# Patient Record
Sex: MALE | URBAN - METROPOLITAN AREA
[De-identification: names, ages, dates, MRNs, and addresses within clinical notes are randomized per-mention and may not be internally consistent; named-entity substitution may affect disease eponyms.]

---

## 2018-07-24 ENCOUNTER — APPOINTMENT (RX ONLY)
Dept: URBAN - METROPOLITAN AREA CLINIC 4 | Facility: CLINIC | Age: 78
Setting detail: DERMATOLOGY
End: 2018-07-24

## 2018-07-24 DIAGNOSIS — M72.0 PALMAR FASCIAL FIBROMATOSIS [DUPUYTREN]: ICD-10-CM

## 2018-07-24 DIAGNOSIS — L40.3 PUSTULOSIS PALMARIS ET PLANTARIS: ICD-10-CM

## 2018-07-24 PROBLEM — I10 ESSENTIAL (PRIMARY) HYPERTENSION: Status: ACTIVE | Noted: 2018-07-24

## 2018-07-24 PROCEDURE — ? COUNSELING: TOPICAL STEROIDS

## 2018-07-24 PROCEDURE — ? PRESCRIPTION

## 2018-07-24 PROCEDURE — ? COUNSELING

## 2018-07-24 PROCEDURE — ? REFERRAL CORRESPONDENCE

## 2018-07-24 PROCEDURE — 99202 OFFICE O/P NEW SF 15 MIN: CPT

## 2018-07-24 RX ORDER — FLUOCINONIDE 0.5 MG/G
1 OINTMENT TOPICAL QD
Qty: 1 | Refills: 2 | Status: ERX | COMMUNITY
Start: 2018-07-24

## 2018-07-24 RX ORDER — UREA 40 G/100G
CREAM TOPICAL
Qty: 1 | Refills: 11 | Status: ERX | COMMUNITY
Start: 2018-07-24

## 2018-07-24 RX ADMIN — FLUOCINONIDE 1: 0.5 OINTMENT TOPICAL at 00:00

## 2018-07-24 RX ADMIN — UREA: 40 CREAM TOPICAL at 00:00

## 2018-07-24 ASSESSMENT — LOCATION SIMPLE DESCRIPTION DERM
LOCATION SIMPLE: RIGHT PLANTAR SURFACE
LOCATION SIMPLE: LEFT HAND
LOCATION SIMPLE: LEFT PLANTAR SURFACE
LOCATION SIMPLE: RIGHT HAND

## 2018-07-24 ASSESSMENT — LOCATION DETAILED DESCRIPTION DERM
LOCATION DETAILED: LEFT MEDIAL PLANTAR MIDFOOT
LOCATION DETAILED: LEFT ULNAR PALM
LOCATION DETAILED: RIGHT THENAR EMINENCE
LOCATION DETAILED: RIGHT PLANTAR FOREFOOT OVERLYING 3RD METATARSAL
LOCATION DETAILED: RIGHT RADIAL PALM

## 2018-07-24 ASSESSMENT — LOCATION ZONE DERM
LOCATION ZONE: HAND
LOCATION ZONE: FEET

## 2018-07-24 NOTE — HPI: RASH (PSORIASIS)
Do You Have A Family History Of Psoriasis?: no
Is This A New Presentation, Or A Follow-Up?: Psoriasis
Additional History: Has used the ointment but his insurance wouldn’t cover it anymore.

## 2021-01-15 DIAGNOSIS — Z23 NEED FOR VACCINATION: ICD-10-CM

## 2021-09-20 ENCOUNTER — HOSPITAL ENCOUNTER (OUTPATIENT)
Dept: RADIATION ONCOLOGY | Facility: MEDICAL CENTER | Age: 81
End: 2021-09-30
Attending: RADIOLOGY
Payer: COMMERCIAL

## 2021-09-20 VITALS
HEIGHT: 67 IN | BODY MASS INDEX: 38.61 KG/M2 | SYSTOLIC BLOOD PRESSURE: 156 MMHG | HEART RATE: 62 BPM | OXYGEN SATURATION: 95 % | DIASTOLIC BLOOD PRESSURE: 92 MMHG | WEIGHT: 246 LBS | TEMPERATURE: 97.1 F

## 2021-09-20 DIAGNOSIS — C61 PROSTATE CANCER (HCC): ICD-10-CM

## 2021-09-20 PROCEDURE — 99214 OFFICE O/P EST MOD 30 MIN: CPT | Performed by: RADIOLOGY

## 2021-09-20 PROCEDURE — 99205 OFFICE O/P NEW HI 60 MIN: CPT | Performed by: RADIOLOGY

## 2021-09-20 RX ORDER — FOLIC ACID 1 MG/1
1 TABLET ORAL DAILY
COMMUNITY

## 2021-09-20 RX ORDER — M-VIT,TX,IRON,MINS/CALC/FOLIC 27MG-0.4MG
1 TABLET ORAL DAILY
COMMUNITY

## 2021-09-20 RX ORDER — ROSUVASTATIN CALCIUM 5 MG/1
5 TABLET, COATED ORAL EVERY EVENING
COMMUNITY

## 2021-09-20 RX ORDER — LEVOTHYROXINE SODIUM 0.2 MG/1
200 TABLET ORAL
COMMUNITY

## 2021-09-20 RX ORDER — CHLORAL HYDRATE 500 MG
1000 CAPSULE ORAL
COMMUNITY

## 2021-09-20 RX ORDER — METHOTREXATE 2.5 MG/1
25 TABLET ORAL
COMMUNITY

## 2021-09-20 RX ORDER — CHOLECALCIFEROL (VITAMIN D3) 125 MCG
500 CAPSULE ORAL DAILY
COMMUNITY

## 2021-09-20 RX ORDER — TAMSULOSIN HYDROCHLORIDE 0.4 MG/1
0.4 CAPSULE ORAL
COMMUNITY

## 2021-09-20 RX ORDER — TRIAMCINOLONE ACETONIDE 1 MG/G
CREAM TOPICAL 2 TIMES DAILY
COMMUNITY

## 2021-09-20 RX ORDER — GALANTAMINE HYDROBROMIDE 4 MG/1
16 TABLET, FILM COATED ORAL DAILY
COMMUNITY

## 2021-09-20 ASSESSMENT — PAIN SCALES - GENERAL: PAINLEVEL: NO PAIN

## 2021-09-20 NOTE — CONSULTS
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 Cody today in consultation at the request of Dr. Rothman for his prostate cancer.  Patient is an 81-year-old gentleman who has had a mildly elevated PSA for a number of years.  His PSA in 2020 was 6.96.  6-month repeat showed an elevation to 12.68 repeated 6 weeks later at 12.10.  Given this precipitous rise transrectal ultrasound with biopsy was recommended.  This confirmed adenocarcinoma the prostate.  He had 4 out of 12 core biopsies positive.  One core biopsy was Richey 4+4 = 8, 2 core biopsies were Richey 4+3 equal 7, and 1 core biopsy with Rima 3+4 equal 7.  His prostate volume was relatively low at 17.5 cc.  Given his high risk component he was given an injection of Lupron.  I been asked to see him for consideration of external beam radiotherapy.    PAST MEDICAL HISTORY:   Past Medical History:   Diagnosis Date   • BPH (benign prostatic hyperplasia)    • Cancer (HCC)     Malignant neoplasm of prostate   • Dementia (HCC)    • Hyperlipidemia    • Obesity    • Psoriasis    • Thyroid disease     Hypothyroid       PAST SURGICAL HISTORY:  Past Surgical History:   Procedure Laterality Date   • ABDOMINAL EXPLORATION      Hernia repair   • CHOLECYSTECTOMY     • HERNIA REPAIR         CURRENT MEDICATIONS:  Current Outpatient Medications   Medication Sig Dispense Refill   • tamsulosin (FLOMAX) 0.4 MG capsule Take 0.4 mg by mouth 1/2 hour after breakfast.     • methotrexate 2.5 MG tablet Take 25 mg by mouth.     • folic acid (FOLVITE) 1 MG Tab Take 1 mg by mouth every day.     • rosuvastatin (CRESTOR) 5 MG Tab Take 5 mg by mouth every evening.     • levothyroxine (SYNTHROID) 200 MCG Tab Take 200 mcg by mouth every morning on an empty stomach.     • CLOBETASOL  "PROP OINT-COAL TAR EX Apply  topically.     • triamcinolone acetonide (KENALOG) 0.1 % Cream Apply  topically 2 times a day.     • aspirin EC (ECOTRIN) 81 MG Tablet Delayed Response Take 81 mg by mouth every day.     • therapeutic multivitamin-minerals (THERAGRAN-M) Tab Take 1 Tablet by mouth every day.     • cyanocobalamin (VITAMIN B-12) 500 MCG Tab Take 500 mcg by mouth every day.     • Omega-3 Fatty Acids (FISH OIL) 1000 MG Cap capsule Take 1,000 mg by mouth 3 times a day with meals.     • Calcium-Vitamins C & D 500- MG-MG-UNIT Chew Tab Chew.     • galantamine (REMINYL) 4 MG Tab Take 16 mg by mouth every day.       No current facility-administered medications for this encounter.       ALLERGIES:    Patient has no known allergies.    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  Had [employer] service time  Lives with: spouse in [place].    REVIEW OF SYSTEMS:  A complete review of systems was completed in patient's chart on 2021.  All are negative with relationship to this diagnosis with the exception of:  Patient does not have any symptoms he would attribute to his prostate cancer.  He denies any musculoskeletal deformities or new pains.  He does have underlying dementia but is well managed.    PHYSICAL EXAM:    Vitals:    21 0853   BP: 156/92   BP Location: Right arm   Patient Position: Sitting   BP Cuff Size: Adult   Pulse: 62   Temp: 36.2 °C (97.1 °F)   TempSrc: Temporal   SpO2: 95%   Weight: 112 kg (246 lb)   Height: 1.702 m (5' 7\")   Pain Score: " No pain      1= Restricted in physically strenuous activity, but ambulatory and able to carry out work of a light sedentary nature, e.g., light housework, office work.    PAIN:  0  Pt reports no acute/ chronic pain at today's office visit.    GENERAL: No apparent distress.  HEENT:  Pupils are equal, round, and reactive to light.  Extraocular muscles   are intact. Sclerae nonicteric.  Conjunctivae pink.  Oral cavity, tongue   protrudes midline.   NECK:  Supple without evidence of thyromegaly.  NODES:  No peripheral adenopathy of the neck, supraclavicular fossa or axillae   bilaterally.  LUNGS:  Clear to ascultation bilaterally   HEART:  Regular rate and rhythm.  No murmur appreciated  ABDOMEN:  Soft. No evidence of hepatosplenomegaly.  Positive bowel sounds.  EXTREMITIES:  Without Edema.  NEUROLOGIC:  Cranial nerves II through XII were intact. Normal stance and gait motor and sensory grossly within normal limits        IPSS:  IN THE PAST MONTH:     1.  Incomplete Emptying: How often have you had the sensation of not emptying your bladder?  3 = About half the time    2.  Frequency: How often have you had to urinate less than every two hours? 4 = More than half the time    3.  Intermittency: How often have you found you stopped and started again several times when you urinated? 5 = Almost always    4.  Urgency: How often have you found it difficult to postpone urination? 4 = More than half the time    5.  Weak Stream: How often have you had a weak urinary stream? 5 = Almost always    6.  Straining: How often have you had to strain to start urination? 5 = Almost always    7.  Nocturia: How many times did you typically get up at night to urinate? 2 = 2 times    TOTAL: 28 20 - 35 = Severe    QUALITY OF LIFE DUE TO URINARY SYMPTOMS:     If you were to spend the rest of your life with your urinary condition just the way it is now, how would you feel about that? 5 = Unhappy      KIRSTEN:  SEXUAL HEALTH INVENTORY FOR MEN (KIRSTEN):    Over the past 6 months:   1.  How do you rate your confidence that you could get and keep an erection?  1 = Very low    2.  When you had erections with sexual stimulation, how often were your erections hard enough for penetration (entering your partner)? 1 = Almost never or never     3.  During sexual intercourse, how often were you able to maintain your erection after you had penetrated (entered) your partner? 0 = Did not attempt    4.  During sexual intercourse, how difficult was it to maintain your erection to completion of intercourse? 0= Did not attempt intercourse    5.  When you attempted sexual intercourse, how often was it satisfactory for you? 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  I discussed with the patient and his wife the features that place him at high risk stratification.  However we highlighted that only one core biopsy had Rima 8 disease.  Therefore the majority of his features are more akin to unfavorable intermediate risk.  I had a discussion with Dr. Rothman regarding his management.  At minimum he will receive short course androgen deprivation therapy and does wish to discuss definitive external beam radiation.  Thus far he is tolerating the Lupron quite well.  I discussed either standard fractionation radiation to the 40 fractions compared with stereotactic radiosurgery of 5 fractions.  We went over the likely side effect profile for each regimen.  Ultimately patient felt the 40 fraction regimen would be his choice.  This is largely to try to minimize some  of the acute side effects seen with stereotactic radiosurgery.  He was given a simulation for next Monday at 11 AM.      We discussed the risks, benefits and side effects of treatment and the patient is amenable to treatment.  If patient has any questions or concerns, he should feel free to contact me.    Thank you for the opportunity to participate in his care.  If any questions or comments, please do not hesitate in calling.

## 2021-09-20 NOTE — CT SIMULATION
PATIENT NAME Vik Cody   PRIMARY PHYSICIAN No primary care provider on file. [id number]   REFERRING PHYSICIAN Bryan Rothman M.D. 1940     Prostate cancer (HCC)  Staging form: Prostate, AJCC 8th Edition  - Clinical: Stage IIC (cT1c, cN0, cM0, PSA: 12.1, Grade Group: 4) - Signed by Jarvis Xiong M.D. on 9/20/2021  Prostate specific antigen (PSA) range: 10 to 19  [place] score: 8  Histologic grading system: 5 grade system         Treatment Planning CT Simulation      Order Questions     Question Answer Comment    Is this for a new course of treatment? Yes     Is this an Addendum? No     Implanted Device/Pacemaker No     Simulation Status Initial     Treatment Site Prostate     Laterality None     Treatment Technique IMRT     Other Technique(s) IMRT     Treatment Pattern/Frequency Daily     Concurrent Chemotherapy No     CT Technique 3D     Slice Thickness 3mm     Scan Extent Pelvis     Bowel Preparation Yes     Treatment Device(s) Vac Elizabeth     Patient Attire Gown     Patient Position Supine     Patient Orientation Head First     Arm Position On Chest     Bladder Full     Treatment Machine No preference     Treatment Image Guidance CBCT     Frequency (CBCT) Daily     Image Guidance Match PTV - Soft Tissue Prostate    Other Orders Weekly Physics Check

## 2021-09-20 NOTE — NON-PROVIDER
"Patient was seen today in clinic with Dr. Xiong for consultation.  Vitals signs and weight were obtained and pain assessment was completed.  Allergies and medications were reviewed with the patient.    Vitals/Pain:  Vitals:    09/20/21 0853   BP: 156/92   BP Location: Right arm   Patient Position: Sitting   BP Cuff Size: Adult   Pulse: 62   Temp: 36.2 °C (97.1 °F)   TempSrc: Temporal   SpO2: 95%   Weight: 112 kg (246 lb)   Height: 1.702 m (5' 7\")   Pain Score: No pain    Allergies:   Patient has no known allergies.    Current Medications:  Current Outpatient Medications   Medication Sig Dispense Refill   • tamsulosin (FLOMAX) 0.4 MG capsule Take 0.4 mg by mouth 1/2 hour after breakfast.     • methotrexate 2.5 MG tablet Take 25 mg by mouth.     • folic acid (FOLVITE) 1 MG Tab Take 1 mg by mouth every day.     • rosuvastatin (CRESTOR) 5 MG Tab Take 5 mg by mouth every evening.     • levothyroxine (SYNTHROID) 200 MCG Tab Take 200 mcg by mouth every morning on an empty stomach.     • CLOBETASOL PROP OINT-COAL TAR EX Apply  topically.     • triamcinolone acetonide (KENALOG) 0.1 % Cream Apply  topically 2 times a day.     • aspirin EC (ECOTRIN) 81 MG Tablet Delayed Response Take 81 mg by mouth every day.     • therapeutic multivitamin-minerals (THERAGRAN-M) Tab Take 1 Tablet by mouth every day.     • cyanocobalamin (VITAMIN B-12) 500 MCG Tab Take 500 mcg by mouth every day.     • Omega-3 Fatty Acids (FISH OIL) 1000 MG Cap capsule Take 1,000 mg by mouth 3 times a day with meals.     • Calcium-Vitamins C & D 500- MG-MG-UNIT Chew Tab Chew.     • galantamine (REMINYL) 4 MG Tab Take 16 mg by mouth every day.       No current facility-administered medications for this encounter.         PCP:  No primary care provider on file.        Nia Vazquez R.N.  "

## 2021-09-21 ENCOUNTER — HOSPITAL ENCOUNTER (OUTPATIENT)
Dept: RADIOLOGY | Facility: MEDICAL CENTER | Age: 81
End: 2021-09-21
Payer: COMMERCIAL

## 2021-09-23 ENCOUNTER — PATIENT OUTREACH (OUTPATIENT)
Dept: OTHER | Facility: MEDICAL CENTER | Age: 81
End: 2021-09-23

## 2021-09-23 NOTE — PROGRESS NOTES
Call placed to patient for nurse navigation, mailbox is full and unable to leave message.  No other number on file.  Will try again at later date.

## 2021-09-27 ENCOUNTER — HOSPITAL ENCOUNTER (OUTPATIENT)
Dept: RADIATION ONCOLOGY | Facility: MEDICAL CENTER | Age: 81
End: 2021-09-27
Payer: COMMERCIAL

## 2021-09-27 PROCEDURE — 77263 THER RADIOLOGY TX PLNG CPLX: CPT | Performed by: RADIOLOGY

## 2021-09-27 PROCEDURE — 77334 RADIATION TREATMENT AID(S): CPT | Mod: 26 | Performed by: RADIOLOGY

## 2021-09-27 PROCEDURE — 77334 RADIATION TREATMENT AID(S): CPT | Performed by: RADIOLOGY

## 2021-09-27 PROCEDURE — 77290 THER RAD SIMULAJ FIELD CPLX: CPT | Performed by: RADIOLOGY

## 2021-09-30 PROCEDURE — 77338 DESIGN MLC DEVICE FOR IMRT: CPT | Performed by: RADIOLOGY

## 2021-09-30 PROCEDURE — 77301 RADIOTHERAPY DOSE PLAN IMRT: CPT | Performed by: RADIOLOGY

## 2021-09-30 PROCEDURE — 77300 RADIATION THERAPY DOSE PLAN: CPT | Mod: 26 | Performed by: RADIOLOGY

## 2021-09-30 PROCEDURE — 77338 DESIGN MLC DEVICE FOR IMRT: CPT | Mod: 26 | Performed by: RADIOLOGY

## 2021-09-30 PROCEDURE — 77301 RADIOTHERAPY DOSE PLAN IMRT: CPT | Mod: 26 | Performed by: RADIOLOGY

## 2021-09-30 PROCEDURE — 77300 RADIATION THERAPY DOSE PLAN: CPT | Performed by: RADIOLOGY

## 2021-10-01 ENCOUNTER — HOSPITAL ENCOUNTER (OUTPATIENT)
Dept: RADIATION ONCOLOGY | Facility: MEDICAL CENTER | Age: 81
End: 2021-10-31
Attending: RADIOLOGY
Payer: COMMERCIAL

## 2021-10-04 ENCOUNTER — HOSPITAL ENCOUNTER (OUTPATIENT)
Dept: RADIATION ONCOLOGY | Facility: MEDICAL CENTER | Age: 81
End: 2021-10-04
Payer: COMMERCIAL

## 2021-10-04 LAB
CHEMOTHERAPY INFUSION START DATE: NORMAL
CHEMOTHERAPY RECORDS: 2
CHEMOTHERAPY RECORDS: 5000
CHEMOTHERAPY RECORDS: NORMAL
CHEMOTHERAPY RX CANCER: NORMAL
DATE 1ST CHEMO CANCER: NORMAL
RAD ONC ARIA COURSE LAST TREATMENT DATE: NORMAL
RAD ONC ARIA COURSE TREATMENT ELAPSED DAYS: NORMAL
RAD ONC ARIA REFERENCE POINT DOSAGE GIVEN TO DATE: 2
RAD ONC ARIA REFERENCE POINT DOSAGE GIVEN TO DATE: 2.06
RAD ONC ARIA REFERENCE POINT ID: NORMAL
RAD ONC ARIA REFERENCE POINT ID: NORMAL
RAD ONC ARIA REFERENCE POINT SESSION DOSAGE GIVEN: 2
RAD ONC ARIA REFERENCE POINT SESSION DOSAGE GIVEN: 2.06

## 2021-10-04 PROCEDURE — 77280 THER RAD SIMULAJ FIELD SMPL: CPT | Performed by: RADIOLOGY

## 2021-10-04 PROCEDURE — 77385 HCHG IMRT DELIVERY SIMPLE: CPT | Performed by: RADIOLOGY

## 2021-10-05 ENCOUNTER — HOSPITAL ENCOUNTER (OUTPATIENT)
Dept: RADIATION ONCOLOGY | Facility: MEDICAL CENTER | Age: 81
End: 2021-10-05

## 2021-10-05 LAB
CHEMOTHERAPY INFUSION START DATE: NORMAL
CHEMOTHERAPY RECORDS: 2
CHEMOTHERAPY RECORDS: 5000
CHEMOTHERAPY RECORDS: NORMAL
CHEMOTHERAPY RX CANCER: NORMAL
DATE 1ST CHEMO CANCER: NORMAL
RAD ONC ARIA COURSE LAST TREATMENT DATE: NORMAL
RAD ONC ARIA COURSE TREATMENT ELAPSED DAYS: NORMAL
RAD ONC ARIA REFERENCE POINT DOSAGE GIVEN TO DATE: 4
RAD ONC ARIA REFERENCE POINT DOSAGE GIVEN TO DATE: 4.13
RAD ONC ARIA REFERENCE POINT ID: NORMAL
RAD ONC ARIA REFERENCE POINT ID: NORMAL
RAD ONC ARIA REFERENCE POINT SESSION DOSAGE GIVEN: 2
RAD ONC ARIA REFERENCE POINT SESSION DOSAGE GIVEN: 2.06

## 2021-10-05 PROCEDURE — 77014 PR CT GUIDANCE PLACEMENT RAD THERAPY FIELDS: CPT | Mod: 26 | Performed by: RADIOLOGY

## 2021-10-05 PROCEDURE — 77385 HCHG IMRT DELIVERY SIMPLE: CPT | Performed by: RADIOLOGY

## 2021-10-06 ENCOUNTER — HOSPITAL ENCOUNTER (OUTPATIENT)
Dept: RADIATION ONCOLOGY | Facility: MEDICAL CENTER | Age: 81
End: 2021-10-06
Payer: COMMERCIAL

## 2021-10-06 VITALS
HEART RATE: 73 BPM | SYSTOLIC BLOOD PRESSURE: 155 MMHG | TEMPERATURE: 97.3 F | OXYGEN SATURATION: 98 % | DIASTOLIC BLOOD PRESSURE: 85 MMHG

## 2021-10-06 LAB
CHEMOTHERAPY INFUSION START DATE: NORMAL
CHEMOTHERAPY RECORDS: 2
CHEMOTHERAPY RECORDS: 5000
CHEMOTHERAPY RECORDS: NORMAL
CHEMOTHERAPY RX CANCER: NORMAL
DATE 1ST CHEMO CANCER: NORMAL
RAD ONC ARIA COURSE LAST TREATMENT DATE: NORMAL
RAD ONC ARIA COURSE TREATMENT ELAPSED DAYS: NORMAL
RAD ONC ARIA REFERENCE POINT DOSAGE GIVEN TO DATE: 6
RAD ONC ARIA REFERENCE POINT DOSAGE GIVEN TO DATE: 6.19
RAD ONC ARIA REFERENCE POINT ID: NORMAL
RAD ONC ARIA REFERENCE POINT ID: NORMAL
RAD ONC ARIA REFERENCE POINT SESSION DOSAGE GIVEN: 2
RAD ONC ARIA REFERENCE POINT SESSION DOSAGE GIVEN: 2.06

## 2021-10-06 PROCEDURE — 77014 PR CT GUIDANCE PLACEMENT RAD THERAPY FIELDS: CPT | Mod: 26 | Performed by: RADIOLOGY

## 2021-10-06 PROCEDURE — 77336 RADIATION PHYSICS CONSULT: CPT | Performed by: RADIOLOGY

## 2021-10-06 PROCEDURE — 77385 HCHG IMRT DELIVERY SIMPLE: CPT | Performed by: RADIOLOGY

## 2021-10-06 ASSESSMENT — PAIN SCALES - GENERAL: PAINLEVEL: NO PAIN

## 2021-10-06 NOTE — ON TREATMENT VISIT
ON TREATMENT  NOTE  RADIATION ONCOLOGY DEPARTMENT    Patient name:  Vik Cody    Primary Physician:  No primary care provider on file. MRN: [id number]  CSN: [id number]   Referring physician:  Bryan Rothman M.D. : 1940, 81 y.o.     ENCOUNTER DATE:  10/06/21    DIAGNOSIS:    Prostate cancer (HCC)  Staging form: Prostate, AJCC 8th Edition  - Clinical: Stage IIC (cT1c, cN0, cM0, PSA: 12.1, Grade Group: 4) - Signed by Jarvis Xiong M.D. on 2021  Prostate specific antigen (PSA) range: 10 to 19  [place] score: 8  Histologic grading system: 5 grade system      TREATMENT SUMMARY:  [facility] Treatment Information        Some values may be hidden. Unless noted otherwise, only the newest values recorded on each date are displayed.         [facility] Treatment Summary 10/6/21   Course First Treatment Date 10/04/2021   Course Last Treatment Date 10/06/2021   Prostate/SV Plan from Course C1_Prostate   Fraction 3 of 25   Elapsed Course Days 2 @ [id number]   Prescribed Fraction Dose 200 cGy   Prescribed Total Dose 5,000 cGy   Prostate/SV Reference Point from Course C1_Prostate   Elapsed Course Days 2 @ [id number]   Session Dose 200 cGy   Total Dose 600 cGy   Prostate/SV CP Reference Point from Course C1_Prostate   Elapsed Course Days 2 @ [id number]   Session Dose 206 cGy   Total Dose 619 cGy             SUBJECTIVE:  Tolerating therapy without event.  Having a little bit of trouble holding his full bladder for treatments but otherwise is fine        VITAL SIGNS:  KPS: 100, Fully active, able to carry on all pre-disease performed without restriction (ECOG equivalent 0)  Encounter Vitals  Temperature: 36.3 °C (97.3 °F)  Temp src: Temporal  Blood Pressure : 155/85  Pulse: 73  Pulse Oximetry: 98 %  Pain Score: No pain  Pain Assessment 10/6/2021 2021   Pain Score 0 0   Some recent data might be hidden          PHYSICAL EXAM:  No change      Toxicity Assessment 10/6/2021   Toxicity Assessment Male Pelvis    Fatigue (lethargy, malaise, asthenia) None   Radiation Dermatitis None   Anorexia None   Colitis None   Constipation None   Dehydration None   Diarrhea w/o Colostomy None   Flatulence None   Nausea None   Proctitis None   Vomiting None   RT - Pain due to RT None   Tumor Pain (onset or exacerbation of tumor pain due to treatment) None   Dysuria (painful urination) None   Urinary Frequency Normal   Urinary Urgency None   Bladder Spasms Absent   Incontinence None   Urinary Retention Normal           IMPRESSION:  Cancer Staging  Prostate cancer (HCC)  Staging form: Prostate, AJCC 8th Edition  - Clinical: Stage IIC (cT1c, cN0, cM0, PSA: 12.1, Grade Group: 4) - Signed by Jarvis Xiong M.D. on 9/20/2021      PLAN:  No change in treatment plan    Disposition:  Treatment plan reviewed. Questions answered. Continue therapy outlined.     Stephani Mason M.D.    No orders of the defined types were placed in this encounter.

## 2021-10-07 ENCOUNTER — HOSPITAL ENCOUNTER (OUTPATIENT)
Dept: RADIATION ONCOLOGY | Facility: MEDICAL CENTER | Age: 81
End: 2021-10-07
Payer: COMMERCIAL

## 2021-10-07 LAB
CHEMOTHERAPY INFUSION START DATE: NORMAL
CHEMOTHERAPY RECORDS: 2
CHEMOTHERAPY RECORDS: 5000
CHEMOTHERAPY RECORDS: NORMAL
CHEMOTHERAPY RX CANCER: NORMAL
DATE 1ST CHEMO CANCER: NORMAL
RAD ONC ARIA COURSE LAST TREATMENT DATE: NORMAL
RAD ONC ARIA COURSE TREATMENT ELAPSED DAYS: NORMAL
RAD ONC ARIA REFERENCE POINT DOSAGE GIVEN TO DATE: 8
RAD ONC ARIA REFERENCE POINT DOSAGE GIVEN TO DATE: 8.25
RAD ONC ARIA REFERENCE POINT ID: NORMAL
RAD ONC ARIA REFERENCE POINT ID: NORMAL
RAD ONC ARIA REFERENCE POINT SESSION DOSAGE GIVEN: 2
RAD ONC ARIA REFERENCE POINT SESSION DOSAGE GIVEN: 2.06

## 2021-10-07 PROCEDURE — 77385 HCHG IMRT DELIVERY SIMPLE: CPT | Performed by: RADIOLOGY

## 2021-10-07 PROCEDURE — 77014 PR CT GUIDANCE PLACEMENT RAD THERAPY FIELDS: CPT | Mod: 26 | Performed by: RADIOLOGY

## 2021-10-08 ENCOUNTER — HOSPITAL ENCOUNTER (OUTPATIENT)
Dept: RADIATION ONCOLOGY | Facility: MEDICAL CENTER | Age: 81
End: 2021-10-08
Payer: COMMERCIAL

## 2021-10-08 LAB
CHEMOTHERAPY INFUSION START DATE: NORMAL
CHEMOTHERAPY RECORDS: 2
CHEMOTHERAPY RECORDS: 5000
CHEMOTHERAPY RECORDS: NORMAL
CHEMOTHERAPY RX CANCER: NORMAL
DATE 1ST CHEMO CANCER: NORMAL
RAD ONC ARIA COURSE LAST TREATMENT DATE: NORMAL
RAD ONC ARIA COURSE TREATMENT ELAPSED DAYS: NORMAL
RAD ONC ARIA REFERENCE POINT DOSAGE GIVEN TO DATE: 10
RAD ONC ARIA REFERENCE POINT DOSAGE GIVEN TO DATE: 10.32
RAD ONC ARIA REFERENCE POINT ID: NORMAL
RAD ONC ARIA REFERENCE POINT ID: NORMAL
RAD ONC ARIA REFERENCE POINT SESSION DOSAGE GIVEN: 2
RAD ONC ARIA REFERENCE POINT SESSION DOSAGE GIVEN: 2.06

## 2021-10-08 PROCEDURE — 77014 PR CT GUIDANCE PLACEMENT RAD THERAPY FIELDS: CPT | Mod: 26 | Performed by: RADIOLOGY

## 2021-10-08 PROCEDURE — 77385 HCHG IMRT DELIVERY SIMPLE: CPT | Performed by: RADIOLOGY

## 2021-10-08 PROCEDURE — 77427 RADIATION TX MANAGEMENT X5: CPT | Performed by: RADIOLOGY

## 2021-10-11 ENCOUNTER — HOSPITAL ENCOUNTER (OUTPATIENT)
Dept: RADIATION ONCOLOGY | Facility: MEDICAL CENTER | Age: 81
End: 2021-10-11
Payer: COMMERCIAL

## 2021-10-11 LAB
CHEMOTHERAPY INFUSION START DATE: NORMAL
CHEMOTHERAPY RECORDS: 2
CHEMOTHERAPY RECORDS: 5000
CHEMOTHERAPY RECORDS: NORMAL
CHEMOTHERAPY RX CANCER: NORMAL
DATE 1ST CHEMO CANCER: NORMAL
RAD ONC ARIA COURSE LAST TREATMENT DATE: NORMAL
RAD ONC ARIA COURSE TREATMENT ELAPSED DAYS: NORMAL
RAD ONC ARIA REFERENCE POINT DOSAGE GIVEN TO DATE: 12
RAD ONC ARIA REFERENCE POINT DOSAGE GIVEN TO DATE: 12.38
RAD ONC ARIA REFERENCE POINT ID: NORMAL
RAD ONC ARIA REFERENCE POINT ID: NORMAL
RAD ONC ARIA REFERENCE POINT SESSION DOSAGE GIVEN: 2
RAD ONC ARIA REFERENCE POINT SESSION DOSAGE GIVEN: 2.06

## 2021-10-11 PROCEDURE — 77014 PR CT GUIDANCE PLACEMENT RAD THERAPY FIELDS: CPT | Mod: 26 | Performed by: RADIOLOGY

## 2021-10-11 PROCEDURE — 77385 HCHG IMRT DELIVERY SIMPLE: CPT | Performed by: RADIOLOGY

## 2021-10-12 ENCOUNTER — HOSPITAL ENCOUNTER (OUTPATIENT)
Dept: RADIATION ONCOLOGY | Facility: MEDICAL CENTER | Age: 81
End: 2021-10-12
Payer: COMMERCIAL

## 2021-10-12 LAB
CHEMOTHERAPY INFUSION START DATE: NORMAL
CHEMOTHERAPY RECORDS: 2
CHEMOTHERAPY RECORDS: 5000
CHEMOTHERAPY RECORDS: NORMAL
CHEMOTHERAPY RX CANCER: NORMAL
DATE 1ST CHEMO CANCER: NORMAL
RAD ONC ARIA COURSE LAST TREATMENT DATE: NORMAL
RAD ONC ARIA COURSE TREATMENT ELAPSED DAYS: NORMAL
RAD ONC ARIA REFERENCE POINT DOSAGE GIVEN TO DATE: 14
RAD ONC ARIA REFERENCE POINT DOSAGE GIVEN TO DATE: 14.44
RAD ONC ARIA REFERENCE POINT ID: NORMAL
RAD ONC ARIA REFERENCE POINT ID: NORMAL
RAD ONC ARIA REFERENCE POINT SESSION DOSAGE GIVEN: 2
RAD ONC ARIA REFERENCE POINT SESSION DOSAGE GIVEN: 2.06

## 2021-10-12 PROCEDURE — 77014 PR CT GUIDANCE PLACEMENT RAD THERAPY FIELDS: CPT | Mod: 26 | Performed by: RADIOLOGY

## 2021-10-12 PROCEDURE — 77385 HCHG IMRT DELIVERY SIMPLE: CPT | Performed by: RADIOLOGY

## 2021-10-13 ENCOUNTER — HOSPITAL ENCOUNTER (OUTPATIENT)
Dept: RADIATION ONCOLOGY | Facility: MEDICAL CENTER | Age: 81
End: 2021-10-13
Payer: COMMERCIAL

## 2021-10-13 VITALS
OXYGEN SATURATION: 95 % | SYSTOLIC BLOOD PRESSURE: 131 MMHG | TEMPERATURE: 96.9 F | DIASTOLIC BLOOD PRESSURE: 61 MMHG | HEART RATE: 74 BPM

## 2021-10-13 LAB
CHEMOTHERAPY INFUSION START DATE: NORMAL
CHEMOTHERAPY RECORDS: 2
CHEMOTHERAPY RECORDS: 5000
CHEMOTHERAPY RECORDS: NORMAL
CHEMOTHERAPY RX CANCER: NORMAL
DATE 1ST CHEMO CANCER: NORMAL
RAD ONC ARIA COURSE LAST TREATMENT DATE: NORMAL
RAD ONC ARIA COURSE TREATMENT ELAPSED DAYS: NORMAL
RAD ONC ARIA REFERENCE POINT DOSAGE GIVEN TO DATE: 16
RAD ONC ARIA REFERENCE POINT DOSAGE GIVEN TO DATE: 16.51
RAD ONC ARIA REFERENCE POINT ID: NORMAL
RAD ONC ARIA REFERENCE POINT ID: NORMAL
RAD ONC ARIA REFERENCE POINT SESSION DOSAGE GIVEN: 2
RAD ONC ARIA REFERENCE POINT SESSION DOSAGE GIVEN: 2.06

## 2021-10-13 PROCEDURE — 77385 HCHG IMRT DELIVERY SIMPLE: CPT | Performed by: RADIOLOGY

## 2021-10-13 PROCEDURE — 77336 RADIATION PHYSICS CONSULT: CPT | Performed by: RADIOLOGY

## 2021-10-13 PROCEDURE — 77014 PR CT GUIDANCE PLACEMENT RAD THERAPY FIELDS: CPT | Mod: 26 | Performed by: RADIOLOGY

## 2021-10-13 ASSESSMENT — PAIN SCALES - GENERAL: PAINLEVEL: NO PAIN

## 2021-10-13 NOTE — ON TREATMENT VISIT
ON TREATMENT NOTE  RADIATION ONCOLOGY DEPARTMENT    Patient name:  Vik Cody    Primary Physician:  Pcp Pt States None MRN: [id number]  CSN: [id number]   Referring physician:  Bryna Rothman M.D. : 1940, 81 y.o.     ENCOUNTER DATE:  10/13/21    DIAGNOSIS:    Prostate cancer (HCC)  Staging form: Prostate, AJCC 8th Edition  - Clinical: Stage IIC (cT1c, cN0, cM0, PSA: 12.1, Grade Group: 4) - Signed by Jarvis Xiong M.D. on 2021  Prostate specific antigen (PSA) range: 10 to 19  [place] score: 8  Histologic grading system: 5 grade system      TREATMENT SUMMARY:  [facility] Treatment Information        Some values may be hidden. Unless noted otherwise, only the newest values recorded on each date are displayed.         [facility] Treatment Summary 10/13/21   Course First Treatment Date 10/04/2021   Course Last Treatment Date 10/13/2021   Prostate/SV Plan from Course C1_Prostate   Fraction 8 of 25   Elapsed Course Days 9 @ [id number]   Prescribed Fraction Dose 200 cGy   Prescribed Total Dose 5,000 cGy   Prostate/SV Reference Point from Course C1_Prostate   Elapsed Course Days 9 @ [id number]   Session Dose 200 cGy   Total Dose 1,600 cGy   Prostate/SV CP Reference Point from Course C1_Prostate   Elapsed Course Days 9 @ [id number]   Session Dose 206 cGy   Total Dose 1,651 cGy              SUBJECTIVE:   Patient is doing well.  He has been experience some hot flashes from his androgen deprivation therapy.  Over the last several days he has also had some increase in loose stools.  I did discuss he could try 1/2 tablet of Imodium if his bowel patterns continue.  Otherwise his urinary patterns are very stable.    VITAL SIGNS:    Encounter Vitals  Temperature: 36.1 °C (96.9 °F)  Blood Pressure : 131/61  Pulse: 74  Pulse Oximetry: 95 %  Pain Score: No pain  Pain Assessment 10/13/2021 10/6/2021 2021   Pain Score 0 0 0   Some recent data might be hidden          PHYSICAL EXAM:  Physical Exam  Genitourinary:      Comments: Normal         TOXICITY  Toxicity Assessment 10/13/2021 10/6/2021   Toxicity Assessment Male Pelvis Male Pelvis   Fatigue (lethargy, malaise, asthenia) Increased fatigue over baseline, but not altering normal activities None   Radiation Dermatitis None None   Anorexia None None   Colitis None None   Constipation None None   Dehydration None None   Diarrhea w/o Colostomy Increase of <4 stools/day over pre-treatment None   Flatulence None None   Nausea None None   Proctitis None None   Vomiting None None   RT - Pain due to RT None None   Tumor Pain (onset or exacerbation of tumor pain due to treatment) None None   Dysuria (painful urination) Mild symptoms requiring no intervention None   Urinary Frequency Increase in frequency up to 2x normal Normal   Urinary Urgency Present None   Bladder Spasms Mild symptoms, not requiring intervention Absent   Incontinence With coughing, sneezing, etc. None   Urinary Retention Hesitency or dribbling, but no significant residual urine and/or retention occuring during the immediate postoperative period Normal         IMPRESSION:  Cancer Staging  Prostate cancer (HCC)  Staging form: Prostate, AJCC 8th Edition  - Clinical: Stage IIC (cT1c, cN0, cM0, PSA: 12.1, Grade Group: 4) - Signed by Jarvis Xiong M.D. on 9/20/2021      PLAN:  No change in treatment plan    Disposition:  Treatment plan reviewed. Questions answered. Continue therapy outlined.     Jarvis Xiong M.D.    No orders of the defined types were placed in this encounter.

## 2021-10-14 ENCOUNTER — HOSPITAL ENCOUNTER (OUTPATIENT)
Dept: RADIATION ONCOLOGY | Facility: MEDICAL CENTER | Age: 81
End: 2021-10-14
Payer: COMMERCIAL

## 2021-10-14 LAB
CHEMOTHERAPY INFUSION START DATE: NORMAL
CHEMOTHERAPY RECORDS: 2
CHEMOTHERAPY RECORDS: 5000
CHEMOTHERAPY RECORDS: NORMAL
CHEMOTHERAPY RX CANCER: NORMAL
DATE 1ST CHEMO CANCER: NORMAL
RAD ONC ARIA COURSE LAST TREATMENT DATE: NORMAL
RAD ONC ARIA COURSE TREATMENT ELAPSED DAYS: NORMAL
RAD ONC ARIA REFERENCE POINT DOSAGE GIVEN TO DATE: 18
RAD ONC ARIA REFERENCE POINT DOSAGE GIVEN TO DATE: 18.57
RAD ONC ARIA REFERENCE POINT ID: NORMAL
RAD ONC ARIA REFERENCE POINT ID: NORMAL
RAD ONC ARIA REFERENCE POINT SESSION DOSAGE GIVEN: 2
RAD ONC ARIA REFERENCE POINT SESSION DOSAGE GIVEN: 2.06

## 2021-10-14 PROCEDURE — 77014 PR CT GUIDANCE PLACEMENT RAD THERAPY FIELDS: CPT | Mod: 26 | Performed by: RADIOLOGY

## 2021-10-14 PROCEDURE — 77385 HCHG IMRT DELIVERY SIMPLE: CPT | Performed by: RADIOLOGY

## 2021-10-15 ENCOUNTER — HOSPITAL ENCOUNTER (OUTPATIENT)
Dept: RADIATION ONCOLOGY | Facility: MEDICAL CENTER | Age: 81
End: 2021-10-15
Payer: COMMERCIAL

## 2021-10-15 LAB
CHEMOTHERAPY INFUSION START DATE: NORMAL
CHEMOTHERAPY RECORDS: 2
CHEMOTHERAPY RECORDS: 5000
CHEMOTHERAPY RECORDS: NORMAL
CHEMOTHERAPY RX CANCER: NORMAL
DATE 1ST CHEMO CANCER: NORMAL
RAD ONC ARIA COURSE LAST TREATMENT DATE: NORMAL
RAD ONC ARIA COURSE TREATMENT ELAPSED DAYS: NORMAL
RAD ONC ARIA REFERENCE POINT DOSAGE GIVEN TO DATE: 20
RAD ONC ARIA REFERENCE POINT DOSAGE GIVEN TO DATE: 20.63
RAD ONC ARIA REFERENCE POINT ID: NORMAL
RAD ONC ARIA REFERENCE POINT ID: NORMAL
RAD ONC ARIA REFERENCE POINT SESSION DOSAGE GIVEN: 2
RAD ONC ARIA REFERENCE POINT SESSION DOSAGE GIVEN: 2.06

## 2021-10-15 PROCEDURE — 77014 PR CT GUIDANCE PLACEMENT RAD THERAPY FIELDS: CPT | Mod: 26 | Performed by: RADIOLOGY

## 2021-10-15 PROCEDURE — 77427 RADIATION TX MANAGEMENT X5: CPT | Performed by: RADIOLOGY

## 2021-10-15 PROCEDURE — 77385 HCHG IMRT DELIVERY SIMPLE: CPT | Performed by: RADIOLOGY

## 2021-10-18 ENCOUNTER — HOSPITAL ENCOUNTER (OUTPATIENT)
Dept: RADIATION ONCOLOGY | Facility: MEDICAL CENTER | Age: 81
End: 2021-10-18
Payer: COMMERCIAL

## 2021-10-18 LAB
CHEMOTHERAPY INFUSION START DATE: NORMAL
CHEMOTHERAPY RECORDS: 2
CHEMOTHERAPY RECORDS: 5000
CHEMOTHERAPY RECORDS: NORMAL
CHEMOTHERAPY RX CANCER: NORMAL
DATE 1ST CHEMO CANCER: NORMAL
RAD ONC ARIA COURSE LAST TREATMENT DATE: NORMAL
RAD ONC ARIA COURSE TREATMENT ELAPSED DAYS: NORMAL
RAD ONC ARIA REFERENCE POINT DOSAGE GIVEN TO DATE: 22
RAD ONC ARIA REFERENCE POINT DOSAGE GIVEN TO DATE: 22.69
RAD ONC ARIA REFERENCE POINT ID: NORMAL
RAD ONC ARIA REFERENCE POINT ID: NORMAL
RAD ONC ARIA REFERENCE POINT SESSION DOSAGE GIVEN: 2
RAD ONC ARIA REFERENCE POINT SESSION DOSAGE GIVEN: 2.06

## 2021-10-18 PROCEDURE — 77385 HCHG IMRT DELIVERY SIMPLE: CPT | Performed by: RADIOLOGY

## 2021-10-18 PROCEDURE — 77014 PR CT GUIDANCE PLACEMENT RAD THERAPY FIELDS: CPT | Mod: 26 | Performed by: RADIOLOGY

## 2021-10-19 ENCOUNTER — HOSPITAL ENCOUNTER (OUTPATIENT)
Dept: RADIATION ONCOLOGY | Facility: MEDICAL CENTER | Age: 81
End: 2021-10-19
Payer: COMMERCIAL

## 2021-10-19 LAB
CHEMOTHERAPY INFUSION START DATE: NORMAL
CHEMOTHERAPY RECORDS: 2
CHEMOTHERAPY RECORDS: 5000
CHEMOTHERAPY RECORDS: NORMAL
CHEMOTHERAPY RX CANCER: NORMAL
DATE 1ST CHEMO CANCER: NORMAL
RAD ONC ARIA COURSE LAST TREATMENT DATE: NORMAL
RAD ONC ARIA COURSE TREATMENT ELAPSED DAYS: NORMAL
RAD ONC ARIA REFERENCE POINT DOSAGE GIVEN TO DATE: 24
RAD ONC ARIA REFERENCE POINT DOSAGE GIVEN TO DATE: 24.76
RAD ONC ARIA REFERENCE POINT ID: NORMAL
RAD ONC ARIA REFERENCE POINT ID: NORMAL
RAD ONC ARIA REFERENCE POINT SESSION DOSAGE GIVEN: 2
RAD ONC ARIA REFERENCE POINT SESSION DOSAGE GIVEN: 2.06

## 2021-10-19 PROCEDURE — 77014 PR CT GUIDANCE PLACEMENT RAD THERAPY FIELDS: CPT | Mod: 26 | Performed by: RADIOLOGY

## 2021-10-19 PROCEDURE — 77385 HCHG IMRT DELIVERY SIMPLE: CPT | Performed by: RADIOLOGY

## 2021-10-20 ENCOUNTER — HOSPITAL ENCOUNTER (OUTPATIENT)
Dept: RADIATION ONCOLOGY | Facility: MEDICAL CENTER | Age: 81
End: 2021-10-20
Payer: COMMERCIAL

## 2021-10-20 VITALS
TEMPERATURE: 97.6 F | HEART RATE: 69 BPM | DIASTOLIC BLOOD PRESSURE: 81 MMHG | OXYGEN SATURATION: 94 % | SYSTOLIC BLOOD PRESSURE: 140 MMHG

## 2021-10-20 LAB
CHEMOTHERAPY INFUSION START DATE: NORMAL
CHEMOTHERAPY RECORDS: 2
CHEMOTHERAPY RECORDS: 5000
CHEMOTHERAPY RECORDS: NORMAL
CHEMOTHERAPY RX CANCER: NORMAL
DATE 1ST CHEMO CANCER: NORMAL
RAD ONC ARIA COURSE LAST TREATMENT DATE: NORMAL
RAD ONC ARIA COURSE TREATMENT ELAPSED DAYS: NORMAL
RAD ONC ARIA REFERENCE POINT DOSAGE GIVEN TO DATE: 26
RAD ONC ARIA REFERENCE POINT DOSAGE GIVEN TO DATE: 26.82
RAD ONC ARIA REFERENCE POINT ID: NORMAL
RAD ONC ARIA REFERENCE POINT ID: NORMAL
RAD ONC ARIA REFERENCE POINT SESSION DOSAGE GIVEN: 2
RAD ONC ARIA REFERENCE POINT SESSION DOSAGE GIVEN: 2.06

## 2021-10-20 PROCEDURE — 77336 RADIATION PHYSICS CONSULT: CPT | Performed by: RADIOLOGY

## 2021-10-20 PROCEDURE — 77014 PR CT GUIDANCE PLACEMENT RAD THERAPY FIELDS: CPT | Mod: 26 | Performed by: RADIOLOGY

## 2021-10-20 PROCEDURE — 77385 HCHG IMRT DELIVERY SIMPLE: CPT | Performed by: RADIOLOGY

## 2021-10-20 ASSESSMENT — PAIN SCALES - GENERAL: PAINLEVEL: NO PAIN

## 2021-10-20 NOTE — ON TREATMENT VISIT
ON TREATMENT NOTE  RADIATION ONCOLOGY DEPARTMENT    Patient name:  Vik Cody    Primary Physician:  Pcp Pt States None MRN: [id number]  CSN: [id number]   Referring physician:  Bryan Rothman M.D. : 1940, 81 y.o.     ENCOUNTER DATE:  10/20/21    DIAGNOSIS:    Prostate cancer (HCC)  Staging form: Prostate, AJCC 8th Edition  - Clinical: Stage IIC (cT1c, cN0, cM0, PSA: 12.1, Grade Group: 4) - Signed by Jarvis Xiong M.D. on 2021  Prostate specific antigen (PSA) range: 10 to 19  [place] score: 8  Histologic grading system: 5 grade system      TREATMENT SUMMARY:  [facility] Treatment Information        Some values may be hidden. Unless noted otherwise, only the newest values recorded on each date are displayed.         [facility] Treatment Summary 10/20/21   Course First Treatment Date 10/04/2021   Course Last Treatment Date 10/20/2021   Prostate/SV Plan from Course C1_Prostate   Fraction    Elapsed Course Days  @    Prescribed Fraction Dose 200 cGy   Prescribed Total Dose 5,000 cGy   Prostate/SV Reference Point from Course C1_Prostate   Elapsed Course Days  @    Session Dose 200 cGy   Total Dose 2,600 cGy   Prostate/SV CP Reference Point from Course C1_Prostate   Elapsed Course Days    Session Dose 206 cGy   Total Dose 2,682 cGy              SUBJECTIVE:   Patient remains at his baseline urinary symptoms.  These include urgency, hesitancy incomplete voiding, and leakage.  He currently takes Flomax 0.4 mg strength.  I have encouraged him to try to increase this to 0.8 mg strength.  He does not yet have any changes he would attribute to his radiation.    VITAL SIGNS:    Encounter Vitals  Temperature: 36.4 °C (97.6 °F)  Blood Pressure : 140/81  Pulse: 69  Pulse Oximetry: 94 %  Pain Score: No pain  Pain Assessment 10/20/2021 10/13/2021 10/6/2021 2021   Pain Score 0 0 0 0   Some recent data might be hidden          PHYSICAL EXAM:  Physical  Exam  Genitourinary:     Comments: Normal         TOXICITY  Toxicity Assessment 10/20/2021 10/13/2021 10/6/2021   Toxicity Assessment Male Pelvis Male Pelvis Male Pelvis   Fatigue (lethargy, malaise, asthenia) Increased fatigue over baseline, but not altering normal activities Increased fatigue over baseline, but not altering normal activities None   Radiation Dermatitis None None None   Anorexia None None None   Colitis None None None   Constipation None None None   Dehydration None None None   Diarrhea w/o Colostomy None Increase of <4 stools/day over pre-treatment None   Flatulence None None None   Nausea None None None   Proctitis None None None   Vomiting None None None   RT - Pain due to RT None None None   Tumor Pain (onset or exacerbation of tumor pain due to treatment) None None None   Dysuria (painful urination) None Mild symptoms requiring no intervention None   Urinary Frequency Increase in frequency up to 2x normal Increase in frequency up to 2x normal Normal   Urinary Urgency None Present None   Bladder Spasms Absent Mild symptoms, not requiring intervention Absent   Incontinence None With coughing, sneezing, etc. None   Urinary Retention Hesitency or dribbling, but no significant residual urine and/or retention occuring during the immediate postoperative period Hesitency or dribbling, but no significant residual urine and/or retention occuring during the immediate postoperative period Normal         IMPRESSION:  Cancer Staging  Prostate cancer (HCC)  Staging form: Prostate, AJCC 8th Edition  - Clinical: Stage IIC (cT1c, cN0, cM0, PSA: 12.1, Grade Group: 4) - Signed by Jarvis Xiong M.D. on 9/20/2021      PLAN:  No change in treatment plan    Disposition:  Treatment plan reviewed. Questions answered. Continue therapy outlined.     Jarvis Xiong M.D.    Orders Placed This Encounter   • Methotrexate (XATMEP PO)

## 2021-10-21 ENCOUNTER — HOSPITAL ENCOUNTER (OUTPATIENT)
Dept: RADIATION ONCOLOGY | Facility: MEDICAL CENTER | Age: 81
End: 2021-10-21
Payer: COMMERCIAL

## 2021-10-21 LAB
CHEMOTHERAPY INFUSION START DATE: NORMAL
CHEMOTHERAPY RECORDS: 2
CHEMOTHERAPY RECORDS: 5000
CHEMOTHERAPY RECORDS: NORMAL
CHEMOTHERAPY RX CANCER: NORMAL
DATE 1ST CHEMO CANCER: NORMAL
RAD ONC ARIA COURSE LAST TREATMENT DATE: NORMAL
RAD ONC ARIA COURSE TREATMENT ELAPSED DAYS: NORMAL
RAD ONC ARIA REFERENCE POINT DOSAGE GIVEN TO DATE: 28
RAD ONC ARIA REFERENCE POINT DOSAGE GIVEN TO DATE: 28.88
RAD ONC ARIA REFERENCE POINT ID: NORMAL
RAD ONC ARIA REFERENCE POINT ID: NORMAL
RAD ONC ARIA REFERENCE POINT SESSION DOSAGE GIVEN: 2
RAD ONC ARIA REFERENCE POINT SESSION DOSAGE GIVEN: 2.06

## 2021-10-21 PROCEDURE — 77014 PR CT GUIDANCE PLACEMENT RAD THERAPY FIELDS: CPT | Mod: 26 | Performed by: RADIOLOGY

## 2021-10-21 PROCEDURE — 77385 HCHG IMRT DELIVERY SIMPLE: CPT | Performed by: RADIOLOGY

## 2021-10-22 ENCOUNTER — HOSPITAL ENCOUNTER (OUTPATIENT)
Dept: RADIATION ONCOLOGY | Facility: MEDICAL CENTER | Age: 81
End: 2021-10-22
Payer: COMMERCIAL

## 2021-10-22 LAB
CHEMOTHERAPY INFUSION START DATE: NORMAL
CHEMOTHERAPY RECORDS: 2
CHEMOTHERAPY RECORDS: 5000
CHEMOTHERAPY RECORDS: NORMAL
CHEMOTHERAPY RX CANCER: NORMAL
DATE 1ST CHEMO CANCER: NORMAL
RAD ONC ARIA COURSE LAST TREATMENT DATE: NORMAL
RAD ONC ARIA COURSE TREATMENT ELAPSED DAYS: NORMAL
RAD ONC ARIA REFERENCE POINT DOSAGE GIVEN TO DATE: 30
RAD ONC ARIA REFERENCE POINT DOSAGE GIVEN TO DATE: 30.95
RAD ONC ARIA REFERENCE POINT ID: NORMAL
RAD ONC ARIA REFERENCE POINT ID: NORMAL
RAD ONC ARIA REFERENCE POINT SESSION DOSAGE GIVEN: 2
RAD ONC ARIA REFERENCE POINT SESSION DOSAGE GIVEN: 2.06

## 2021-10-22 PROCEDURE — 77385 HCHG IMRT DELIVERY SIMPLE: CPT | Performed by: RADIOLOGY

## 2021-10-22 PROCEDURE — 77427 RADIATION TX MANAGEMENT X5: CPT | Performed by: RADIOLOGY

## 2021-10-22 PROCEDURE — 77014 PR CT GUIDANCE PLACEMENT RAD THERAPY FIELDS: CPT | Mod: 26 | Performed by: RADIOLOGY

## 2021-10-25 ENCOUNTER — HOSPITAL ENCOUNTER (OUTPATIENT)
Dept: RADIATION ONCOLOGY | Facility: MEDICAL CENTER | Age: 81
End: 2021-10-25
Payer: COMMERCIAL

## 2021-10-25 LAB
CHEMOTHERAPY INFUSION START DATE: NORMAL
CHEMOTHERAPY RECORDS: 2
CHEMOTHERAPY RECORDS: 5000
CHEMOTHERAPY RECORDS: NORMAL
CHEMOTHERAPY RX CANCER: NORMAL
DATE 1ST CHEMO CANCER: NORMAL
RAD ONC ARIA COURSE LAST TREATMENT DATE: NORMAL
RAD ONC ARIA COURSE TREATMENT ELAPSED DAYS: NORMAL
RAD ONC ARIA REFERENCE POINT DOSAGE GIVEN TO DATE: 32
RAD ONC ARIA REFERENCE POINT DOSAGE GIVEN TO DATE: 33.01
RAD ONC ARIA REFERENCE POINT ID: NORMAL
RAD ONC ARIA REFERENCE POINT ID: NORMAL
RAD ONC ARIA REFERENCE POINT SESSION DOSAGE GIVEN: 2
RAD ONC ARIA REFERENCE POINT SESSION DOSAGE GIVEN: 2.06

## 2021-10-25 PROCEDURE — 77385 HCHG IMRT DELIVERY SIMPLE: CPT | Performed by: RADIOLOGY

## 2021-10-25 PROCEDURE — 77014 PR CT GUIDANCE PLACEMENT RAD THERAPY FIELDS: CPT | Mod: 26 | Performed by: RADIOLOGY

## 2021-10-26 ENCOUNTER — HOSPITAL ENCOUNTER (OUTPATIENT)
Dept: RADIATION ONCOLOGY | Facility: MEDICAL CENTER | Age: 81
End: 2021-10-26
Payer: COMMERCIAL

## 2021-10-26 LAB
CHEMOTHERAPY INFUSION START DATE: NORMAL
CHEMOTHERAPY RECORDS: 2
CHEMOTHERAPY RECORDS: 5000
CHEMOTHERAPY RECORDS: NORMAL
CHEMOTHERAPY RX CANCER: NORMAL
DATE 1ST CHEMO CANCER: NORMAL
RAD ONC ARIA COURSE LAST TREATMENT DATE: NORMAL
RAD ONC ARIA COURSE TREATMENT ELAPSED DAYS: NORMAL
RAD ONC ARIA REFERENCE POINT DOSAGE GIVEN TO DATE: 34
RAD ONC ARIA REFERENCE POINT DOSAGE GIVEN TO DATE: 35.07
RAD ONC ARIA REFERENCE POINT ID: NORMAL
RAD ONC ARIA REFERENCE POINT ID: NORMAL
RAD ONC ARIA REFERENCE POINT SESSION DOSAGE GIVEN: 2
RAD ONC ARIA REFERENCE POINT SESSION DOSAGE GIVEN: 2.06

## 2021-10-26 PROCEDURE — 77014 PR CT GUIDANCE PLACEMENT RAD THERAPY FIELDS: CPT | Mod: 26 | Performed by: RADIOLOGY

## 2021-10-26 PROCEDURE — 77385 HCHG IMRT DELIVERY SIMPLE: CPT | Performed by: RADIOLOGY

## 2021-10-27 ENCOUNTER — HOSPITAL ENCOUNTER (OUTPATIENT)
Dept: RADIATION ONCOLOGY | Facility: MEDICAL CENTER | Age: 81
End: 2021-10-27
Payer: COMMERCIAL

## 2021-10-27 VITALS
OXYGEN SATURATION: 95 % | DIASTOLIC BLOOD PRESSURE: 86 MMHG | TEMPERATURE: 97.6 F | HEART RATE: 105 BPM | SYSTOLIC BLOOD PRESSURE: 133 MMHG

## 2021-10-27 LAB
CHEMOTHERAPY INFUSION START DATE: NORMAL
CHEMOTHERAPY RECORDS: 2
CHEMOTHERAPY RECORDS: 5000
CHEMOTHERAPY RECORDS: NORMAL
CHEMOTHERAPY RX CANCER: NORMAL
DATE 1ST CHEMO CANCER: NORMAL
RAD ONC ARIA COURSE LAST TREATMENT DATE: NORMAL
RAD ONC ARIA COURSE TREATMENT ELAPSED DAYS: NORMAL
RAD ONC ARIA REFERENCE POINT DOSAGE GIVEN TO DATE: 36
RAD ONC ARIA REFERENCE POINT DOSAGE GIVEN TO DATE: 37.14
RAD ONC ARIA REFERENCE POINT ID: NORMAL
RAD ONC ARIA REFERENCE POINT ID: NORMAL
RAD ONC ARIA REFERENCE POINT SESSION DOSAGE GIVEN: 2
RAD ONC ARIA REFERENCE POINT SESSION DOSAGE GIVEN: 2.06

## 2021-10-27 PROCEDURE — 77385 HCHG IMRT DELIVERY SIMPLE: CPT | Performed by: RADIOLOGY

## 2021-10-27 PROCEDURE — 77014 PR CT GUIDANCE PLACEMENT RAD THERAPY FIELDS: CPT | Mod: 26 | Performed by: RADIOLOGY

## 2021-10-27 PROCEDURE — 77336 RADIATION PHYSICS CONSULT: CPT | Performed by: RADIOLOGY

## 2021-10-27 ASSESSMENT — PAIN SCALES - GENERAL: PAINLEVEL: NO PAIN

## 2021-10-27 NOTE — ON TREATMENT VISIT
ON TREATMENT NOTE  RADIATION ONCOLOGY DEPARTMENT    Patient name:  Vik Cody    Primary Physician:  Pcp Pt States None MRN: [id number]  CSN: [id number]   Referring physician:  Bryan Rothman M.D. : 1940, 81 y.o.     ENCOUNTER DATE:  10/27/21    DIAGNOSIS:    Prostate cancer (HCC)  Staging form: Prostate, AJCC 8th Edition  - Clinical: Stage IIC (cT1c, cN0, cM0, PSA: 12.1, Grade Group: 4) - Signed by Jarvis Xiong M.D. on 2021  Prostate specific antigen (PSA) range: 10 to 19  [place] score: 8  Histologic grading system: 5 grade system      TREATMENT SUMMARY:  [facility] Treatment Information        Some values may be hidden. Unless noted otherwise, only the newest values recorded on each date are displayed.         [facility] Treatment Summary 10/27/21   Course First Treatment Date 10/04/2021   Course Last Treatment Date 10/27/2021   Prostate/SV Plan from Course C1_Prostate   Fraction 18 of 25   Elapsed Course Days  @ [id number]   Prescribed Fraction Dose 200 cGy   Prescribed Total Dose 5,000 cGy   Prostate/SV Reference Point from Course C1_Prostate   Elapsed Course Days  @ [id number]   Session Dose 200 cGy   Total Dose 3,600 cGy   Prostate/SV CP Reference Point from Course C1_Prostate   Elapsed Course Days  @ [id number]   Session Dose 206 cGy   Total Dose 3,714 cGy              SUBJECTIVE:   Patient is doing well.  He does not have any changes he would attribute to his radiation.  We did appreciate however a variable heart rate on today's visit.  Both by machine and manual palpation.  He states he does not have a history of atrial fibrillation or flutter.  He is completely asymptomatic.  I have asked him to be aware of any symptoms that could arise but otherwise I would like him to stop after treatment tomorrow and also have us check his pulse.    VITAL SIGNS:    Encounter Vitals  Temperature: 36.4 °C (97.6 °F)  Blood Pressure : 133/86  Pulse: (!) 105  Pulse Oximetry: 95 %  Pain Score: No  pain  Pain Assessment 10/27/2021 10/20/2021 10/13/2021 10/6/2021 9/20/2021   Pain Score 0 0 0 0 0   Some recent data might be hidden          PHYSICAL EXAM:  Physical Exam  Genitourinary:     Comments: Normal         TOXICITY  Toxicity Assessment 10/27/2021 10/20/2021 10/13/2021 10/6/2021   Toxicity Assessment Male Pelvis Male Pelvis Male Pelvis Male Pelvis   Fatigue (lethargy, malaise, asthenia) None Increased fatigue over baseline, but not altering normal activities Increased fatigue over baseline, but not altering normal activities None   Radiation Dermatitis None None None None   Anorexia None None None None   Colitis None None None None   Constipation None None None None   Dehydration None None None None   Diarrhea w/o Colostomy None None Increase of <4 stools/day over pre-treatment None   Flatulence None None None None   Nausea None None None None   Proctitis None None None None   Vomiting None None None None   RT - Pain due to RT None None None None   Tumor Pain (onset or exacerbation of tumor pain due to treatment) None None None None   Dysuria (painful urination) None None Mild symptoms requiring no intervention None   Urinary Frequency Normal Increase in frequency up to 2x normal Increase in frequency up to 2x normal Normal   Urinary Urgency None None Present None   Bladder Spasms Absent Absent Mild symptoms, not requiring intervention Absent   Incontinence None None With coughing, sneezing, etc. None   Urinary Retention Normal Hesitency or dribbling, but no significant residual urine and/or retention occuring during the immediate postoperative period Hesitency or dribbling, but no significant residual urine and/or retention occuring during the immediate postoperative period Normal         IMPRESSION:  Cancer Staging  Prostate cancer (HCC)  Staging form: Prostate, AJCC 8th Edition  - Clinical: Stage IIC (cT1c, cN0, cM0, PSA: 12.1, Grade Group: 4) - Signed by Jarvis Xiong M.D. on  9/20/2021      PLAN:  No change in treatment plan    Disposition:  Treatment plan reviewed. Questions answered. Continue therapy outlined.     Jarvis Xiong M.D.    No orders of the defined types were placed in this encounter.

## 2021-10-28 ENCOUNTER — HOSPITAL ENCOUNTER (OUTPATIENT)
Dept: RADIATION ONCOLOGY | Facility: MEDICAL CENTER | Age: 81
End: 2021-10-28
Payer: COMMERCIAL

## 2021-10-28 LAB
CHEMOTHERAPY INFUSION START DATE: NORMAL
CHEMOTHERAPY RECORDS: 2
CHEMOTHERAPY RECORDS: 5000
CHEMOTHERAPY RECORDS: NORMAL
CHEMOTHERAPY RX CANCER: NORMAL
DATE 1ST CHEMO CANCER: NORMAL
RAD ONC ARIA COURSE LAST TREATMENT DATE: NORMAL
RAD ONC ARIA COURSE TREATMENT ELAPSED DAYS: NORMAL
RAD ONC ARIA REFERENCE POINT DOSAGE GIVEN TO DATE: 38
RAD ONC ARIA REFERENCE POINT DOSAGE GIVEN TO DATE: 39.2
RAD ONC ARIA REFERENCE POINT ID: NORMAL
RAD ONC ARIA REFERENCE POINT ID: NORMAL
RAD ONC ARIA REFERENCE POINT SESSION DOSAGE GIVEN: 2
RAD ONC ARIA REFERENCE POINT SESSION DOSAGE GIVEN: 2.06

## 2021-10-28 PROCEDURE — 77385 HCHG IMRT DELIVERY SIMPLE: CPT | Performed by: RADIOLOGY

## 2021-10-28 PROCEDURE — 77014 PR CT GUIDANCE PLACEMENT RAD THERAPY FIELDS: CPT | Mod: 26 | Performed by: RADIOLOGY

## 2021-11-01 ENCOUNTER — HOSPITAL ENCOUNTER (OUTPATIENT)
Dept: RADIATION ONCOLOGY | Facility: MEDICAL CENTER | Age: 81
End: 2021-11-01

## 2021-11-01 ENCOUNTER — HOSPITAL ENCOUNTER (OUTPATIENT)
Dept: RADIATION ONCOLOGY | Facility: MEDICAL CENTER | Age: 81
End: 2021-11-30
Attending: RADIOLOGY
Payer: COMMERCIAL

## 2021-11-01 LAB
CHEMOTHERAPY INFUSION START DATE: NORMAL
CHEMOTHERAPY RECORDS: 2
CHEMOTHERAPY RECORDS: 5000
CHEMOTHERAPY RECORDS: NORMAL
CHEMOTHERAPY RX CANCER: NORMAL
DATE 1ST CHEMO CANCER: NORMAL
RAD ONC ARIA COURSE LAST TREATMENT DATE: NORMAL
RAD ONC ARIA COURSE TREATMENT ELAPSED DAYS: NORMAL
RAD ONC ARIA REFERENCE POINT DOSAGE GIVEN TO DATE: 40
RAD ONC ARIA REFERENCE POINT DOSAGE GIVEN TO DATE: 41.26
RAD ONC ARIA REFERENCE POINT ID: NORMAL
RAD ONC ARIA REFERENCE POINT ID: NORMAL
RAD ONC ARIA REFERENCE POINT SESSION DOSAGE GIVEN: 2
RAD ONC ARIA REFERENCE POINT SESSION DOSAGE GIVEN: 2.06

## 2021-11-01 PROCEDURE — 77427 RADIATION TX MANAGEMENT X5: CPT | Performed by: RADIOLOGY

## 2021-11-01 PROCEDURE — 77014 PR CT GUIDANCE PLACEMENT RAD THERAPY FIELDS: CPT | Mod: 26 | Performed by: RADIOLOGY

## 2021-11-01 PROCEDURE — 77385 HCHG IMRT DELIVERY SIMPLE: CPT | Performed by: RADIOLOGY

## 2021-11-02 ENCOUNTER — HOSPITAL ENCOUNTER (OUTPATIENT)
Dept: RADIATION ONCOLOGY | Facility: MEDICAL CENTER | Age: 81
End: 2021-11-02
Payer: COMMERCIAL

## 2021-11-02 LAB
CHEMOTHERAPY INFUSION START DATE: NORMAL
CHEMOTHERAPY RECORDS: 2
CHEMOTHERAPY RECORDS: 5000
CHEMOTHERAPY RECORDS: NORMAL
CHEMOTHERAPY RX CANCER: NORMAL
DATE 1ST CHEMO CANCER: NORMAL
RAD ONC ARIA COURSE LAST TREATMENT DATE: NORMAL
RAD ONC ARIA COURSE TREATMENT ELAPSED DAYS: NORMAL
RAD ONC ARIA REFERENCE POINT DOSAGE GIVEN TO DATE: 42
RAD ONC ARIA REFERENCE POINT DOSAGE GIVEN TO DATE: 43.33
RAD ONC ARIA REFERENCE POINT ID: NORMAL
RAD ONC ARIA REFERENCE POINT ID: NORMAL
RAD ONC ARIA REFERENCE POINT SESSION DOSAGE GIVEN: 2
RAD ONC ARIA REFERENCE POINT SESSION DOSAGE GIVEN: 2.06

## 2021-11-02 PROCEDURE — 77014 PR CT GUIDANCE PLACEMENT RAD THERAPY FIELDS: CPT | Mod: 26 | Performed by: RADIOLOGY

## 2021-11-02 PROCEDURE — 77385 HCHG IMRT DELIVERY SIMPLE: CPT | Performed by: RADIOLOGY

## 2021-11-03 ENCOUNTER — HOSPITAL ENCOUNTER (OUTPATIENT)
Dept: RADIATION ONCOLOGY | Facility: MEDICAL CENTER | Age: 81
End: 2021-11-03
Payer: COMMERCIAL

## 2021-11-03 VITALS
OXYGEN SATURATION: 94 % | DIASTOLIC BLOOD PRESSURE: 79 MMHG | TEMPERATURE: 97.5 F | HEART RATE: 68 BPM | SYSTOLIC BLOOD PRESSURE: 150 MMHG

## 2021-11-03 LAB
CHEMOTHERAPY INFUSION START DATE: NORMAL
CHEMOTHERAPY RECORDS: 2
CHEMOTHERAPY RECORDS: 5000
CHEMOTHERAPY RECORDS: NORMAL
CHEMOTHERAPY RX CANCER: NORMAL
DATE 1ST CHEMO CANCER: NORMAL
RAD ONC ARIA COURSE LAST TREATMENT DATE: NORMAL
RAD ONC ARIA COURSE TREATMENT ELAPSED DAYS: NORMAL
RAD ONC ARIA REFERENCE POINT DOSAGE GIVEN TO DATE: 44
RAD ONC ARIA REFERENCE POINT DOSAGE GIVEN TO DATE: 45.39
RAD ONC ARIA REFERENCE POINT ID: NORMAL
RAD ONC ARIA REFERENCE POINT ID: NORMAL
RAD ONC ARIA REFERENCE POINT SESSION DOSAGE GIVEN: 2
RAD ONC ARIA REFERENCE POINT SESSION DOSAGE GIVEN: 2.06

## 2021-11-03 PROCEDURE — 77385 HCHG IMRT DELIVERY SIMPLE: CPT | Performed by: RADIOLOGY

## 2021-11-03 PROCEDURE — 77014 PR CT GUIDANCE PLACEMENT RAD THERAPY FIELDS: CPT | Mod: 26 | Performed by: RADIOLOGY

## 2021-11-03 ASSESSMENT — PAIN SCALES - GENERAL: PAINLEVEL: NO PAIN

## 2021-11-03 NOTE — ON TREATMENT VISIT
ON TREATMENT NOTE  RADIATION ONCOLOGY DEPARTMENT    Patient name:  Vik Cody    Primary Physician:  Pcp Pt States None MRN: [id number]  CSN: [id number]   Referring physician:  Bryan Rothman M.D. : 1940, 81 y.o.     ENCOUNTER DATE:  21    DIAGNOSIS:    Prostate cancer (HCC)  Staging form: Prostate, AJCC 8th Edition  - Clinical: Stage IIC (cT1c, cN0, cM0, PSA: 12.1, Grade Group: 4) - Signed by Jarvis Xiong M.D. on 2021  Prostate specific antigen (PSA) range: 10 to 19  [place] score: 8  Histologic grading system: 5 grade system      TREATMENT SUMMARY:  [facility] Treatment Information        Some values may be hidden. Unless noted otherwise, only the newest values recorded on each date are displayed.         [facility] Treatment Summary 11/3/21   Course First Treatment Date 10/04/2021   Course Last Treatment Date 2021   Prostate/SV Plan from Course C1_Prostate   Fraction    Elapsed Course Days  @ [id number]   Prescribed Fraction Dose 200 cGy   Prescribed Total Dose 5,000 cGy   Prostate/SV Reference Point from Course C1_Prostate   Elapsed Course Days  @ [id number]   Session Dose 200 cGy   Total Dose 4,400 cGy   Prostate/SV CP Reference Point from Course C1_Prostate   Elapsed Course Days  @ [id number]   Session Dose 206 cGy   Total Dose 4,539 cGy              SUBJECTIVE:   Patient is doing well.  He does not have any changes he would attribute to his radiation.    VITAL SIGNS:    Encounter Vitals  Temperature: 36.4 °C (97.5 °F)  Blood Pressure : 150/79  Pulse: 68  Pulse Oximetry: 94 %  Pain Score: No pain  Pain Assessment 11/3/2021 10/27/2021 10/20/2021 10/13/2021 10/6/2021 2021   Pain Score 0 0 0 0 0 0   Some recent data might be hidden          PHYSICAL EXAM:  Physical Exam  Genitourinary:     Comments: Normal         TOXICITY  Toxicity Assessment 11/3/2021 10/27/2021 10/20/2021 10/13/2021 10/6/2021   Toxicity Assessment Male Pelvis Male Pelvis Male Pelvis Male  Pelvis Male Pelvis   Fatigue (lethargy, malaise, asthenia) None None Increased fatigue over baseline, but not altering normal activities Increased fatigue over baseline, but not altering normal activities None   Radiation Dermatitis None None None None None   Anorexia None None None None None   Colitis None None None None None   Constipation None None None None None   Dehydration None None None None None   Diarrhea w/o Colostomy None None None Increase of <4 stools/day over pre-treatment None   Flatulence None None None None None   Nausea None None None None None   Proctitis None None None None None   Vomiting None None None None None   RT - Pain due to RT None None None None None   Tumor Pain (onset or exacerbation of tumor pain due to treatment) None None None None None   Dysuria (painful urination) None None None Mild symptoms requiring no intervention None   Urinary Frequency Normal Normal Increase in frequency up to 2x normal Increase in frequency up to 2x normal Normal   Urinary Urgency None None None Present None   Bladder Spasms Absent Absent Absent Mild symptoms, not requiring intervention Absent   Incontinence None None None With coughing, sneezing, etc. None   Urinary Retention Normal Normal Hesitency or dribbling, but no significant residual urine and/or retention occuring during the immediate postoperative period Hesitency or dribbling, but no significant residual urine and/or retention occuring during the immediate postoperative period Normal         IMPRESSION:  Cancer Staging  Prostate cancer (HCC)  Staging form: Prostate, AJCC 8th Edition  - Clinical: Stage IIC (cT1c, cN0, cM0, PSA: 12.1, Grade Group: 4) - Signed by Jarvis Xiong M.D. on 9/20/2021      PLAN:  No change in treatment plan    Disposition:  Treatment plan reviewed. Questions answered. Continue therapy outlined.     Jarvis Xiong M.D.    No orders of the defined types were placed in this encounter.

## 2021-11-04 ENCOUNTER — HOSPITAL ENCOUNTER (OUTPATIENT)
Dept: RADIATION ONCOLOGY | Facility: MEDICAL CENTER | Age: 81
End: 2021-11-04
Payer: COMMERCIAL

## 2021-11-04 LAB
CHEMOTHERAPY INFUSION START DATE: NORMAL
CHEMOTHERAPY RECORDS: 2
CHEMOTHERAPY RECORDS: 5000
CHEMOTHERAPY RECORDS: NORMAL
CHEMOTHERAPY RX CANCER: NORMAL
DATE 1ST CHEMO CANCER: NORMAL
RAD ONC ARIA COURSE LAST TREATMENT DATE: NORMAL
RAD ONC ARIA COURSE TREATMENT ELAPSED DAYS: NORMAL
RAD ONC ARIA REFERENCE POINT DOSAGE GIVEN TO DATE: 46
RAD ONC ARIA REFERENCE POINT DOSAGE GIVEN TO DATE: 47.45
RAD ONC ARIA REFERENCE POINT ID: NORMAL
RAD ONC ARIA REFERENCE POINT ID: NORMAL
RAD ONC ARIA REFERENCE POINT SESSION DOSAGE GIVEN: 2
RAD ONC ARIA REFERENCE POINT SESSION DOSAGE GIVEN: 2.06

## 2021-11-04 PROCEDURE — 77385 HCHG IMRT DELIVERY SIMPLE: CPT | Performed by: RADIOLOGY

## 2021-11-04 PROCEDURE — 77338 DESIGN MLC DEVICE FOR IMRT: CPT | Mod: 26 | Performed by: RADIOLOGY

## 2021-11-04 PROCEDURE — 77014 PR CT GUIDANCE PLACEMENT RAD THERAPY FIELDS: CPT | Mod: 26 | Performed by: RADIOLOGY

## 2021-11-04 PROCEDURE — 77300 RADIATION THERAPY DOSE PLAN: CPT | Performed by: RADIOLOGY

## 2021-11-04 PROCEDURE — 77336 RADIATION PHYSICS CONSULT: CPT | Performed by: RADIOLOGY

## 2021-11-04 PROCEDURE — 77338 DESIGN MLC DEVICE FOR IMRT: CPT | Mod: XU | Performed by: RADIOLOGY

## 2021-11-04 PROCEDURE — 77300 RADIATION THERAPY DOSE PLAN: CPT | Mod: 26 | Performed by: RADIOLOGY

## 2021-11-05 ENCOUNTER — HOSPITAL ENCOUNTER (OUTPATIENT)
Dept: RADIATION ONCOLOGY | Facility: MEDICAL CENTER | Age: 81
End: 2021-11-05
Payer: COMMERCIAL

## 2021-11-05 LAB
CHEMOTHERAPY INFUSION START DATE: NORMAL
CHEMOTHERAPY RECORDS: 2
CHEMOTHERAPY RECORDS: 5000
CHEMOTHERAPY RECORDS: NORMAL
CHEMOTHERAPY RX CANCER: NORMAL
DATE 1ST CHEMO CANCER: NORMAL
RAD ONC ARIA COURSE LAST TREATMENT DATE: NORMAL
RAD ONC ARIA COURSE TREATMENT ELAPSED DAYS: NORMAL
RAD ONC ARIA REFERENCE POINT DOSAGE GIVEN TO DATE: 48
RAD ONC ARIA REFERENCE POINT DOSAGE GIVEN TO DATE: 49.52
RAD ONC ARIA REFERENCE POINT ID: NORMAL
RAD ONC ARIA REFERENCE POINT ID: NORMAL
RAD ONC ARIA REFERENCE POINT SESSION DOSAGE GIVEN: 2
RAD ONC ARIA REFERENCE POINT SESSION DOSAGE GIVEN: 2.06

## 2021-11-05 PROCEDURE — 77014 PR CT GUIDANCE PLACEMENT RAD THERAPY FIELDS: CPT | Mod: 26 | Performed by: RADIOLOGY

## 2021-11-05 PROCEDURE — 77385 HCHG IMRT DELIVERY SIMPLE: CPT | Performed by: RADIOLOGY

## 2021-11-08 ENCOUNTER — HOSPITAL ENCOUNTER (OUTPATIENT)
Dept: RADIATION ONCOLOGY | Facility: MEDICAL CENTER | Age: 81
End: 2021-11-08
Payer: COMMERCIAL

## 2021-11-08 LAB
CHEMOTHERAPY INFUSION START DATE: NORMAL
CHEMOTHERAPY RECORDS: 2
CHEMOTHERAPY RECORDS: 5000
CHEMOTHERAPY RECORDS: NORMAL
CHEMOTHERAPY RX CANCER: NORMAL
DATE 1ST CHEMO CANCER: NORMAL
RAD ONC ARIA COURSE LAST TREATMENT DATE: NORMAL
RAD ONC ARIA COURSE TREATMENT ELAPSED DAYS: NORMAL
RAD ONC ARIA REFERENCE POINT DOSAGE GIVEN TO DATE: 50
RAD ONC ARIA REFERENCE POINT DOSAGE GIVEN TO DATE: 51.58
RAD ONC ARIA REFERENCE POINT ID: NORMAL
RAD ONC ARIA REFERENCE POINT ID: NORMAL
RAD ONC ARIA REFERENCE POINT SESSION DOSAGE GIVEN: 2
RAD ONC ARIA REFERENCE POINT SESSION DOSAGE GIVEN: 2.06

## 2021-11-08 PROCEDURE — 77385 HCHG IMRT DELIVERY SIMPLE: CPT | Performed by: RADIOLOGY

## 2021-11-08 PROCEDURE — 77427 RADIATION TX MANAGEMENT X5: CPT | Performed by: RADIOLOGY

## 2021-11-08 PROCEDURE — 77014 PR CT GUIDANCE PLACEMENT RAD THERAPY FIELDS: CPT | Mod: 26 | Performed by: RADIOLOGY

## 2021-11-09 ENCOUNTER — HOSPITAL ENCOUNTER (OUTPATIENT)
Dept: RADIATION ONCOLOGY | Facility: MEDICAL CENTER | Age: 81
End: 2021-11-09
Payer: COMMERCIAL

## 2021-11-09 LAB
CHEMOTHERAPY INFUSION START DATE: NORMAL
CHEMOTHERAPY RECORDS: 2
CHEMOTHERAPY RECORDS: 3000
CHEMOTHERAPY RECORDS: NORMAL
CHEMOTHERAPY RX CANCER: NORMAL
DATE 1ST CHEMO CANCER: NORMAL
RAD ONC ARIA COURSE LAST TREATMENT DATE: NORMAL
RAD ONC ARIA COURSE TREATMENT ELAPSED DAYS: NORMAL
RAD ONC ARIA REFERENCE POINT DOSAGE GIVEN TO DATE: 2
RAD ONC ARIA REFERENCE POINT DOSAGE GIVEN TO DATE: 2.04
RAD ONC ARIA REFERENCE POINT ID: NORMAL
RAD ONC ARIA REFERENCE POINT ID: NORMAL
RAD ONC ARIA REFERENCE POINT SESSION DOSAGE GIVEN: 2
RAD ONC ARIA REFERENCE POINT SESSION DOSAGE GIVEN: 2.04

## 2021-11-09 PROCEDURE — 77014 PR CT GUIDANCE PLACEMENT RAD THERAPY FIELDS: CPT | Mod: 26 | Performed by: RADIOLOGY

## 2021-11-09 PROCEDURE — 77385 HCHG IMRT DELIVERY SIMPLE: CPT | Performed by: RADIOLOGY

## 2021-11-10 ENCOUNTER — HOSPITAL ENCOUNTER (OUTPATIENT)
Dept: RADIATION ONCOLOGY | Facility: MEDICAL CENTER | Age: 81
End: 2021-11-10
Payer: COMMERCIAL

## 2021-11-10 VITALS — HEART RATE: 66 BPM | OXYGEN SATURATION: 96 % | TEMPERATURE: 97.1 F

## 2021-11-10 LAB
CHEMOTHERAPY INFUSION START DATE: NORMAL
CHEMOTHERAPY RECORDS: 2
CHEMOTHERAPY RECORDS: 3000
CHEMOTHERAPY RECORDS: NORMAL
CHEMOTHERAPY RX CANCER: NORMAL
DATE 1ST CHEMO CANCER: NORMAL
RAD ONC ARIA COURSE LAST TREATMENT DATE: NORMAL
RAD ONC ARIA COURSE TREATMENT ELAPSED DAYS: NORMAL
RAD ONC ARIA REFERENCE POINT DOSAGE GIVEN TO DATE: 4
RAD ONC ARIA REFERENCE POINT DOSAGE GIVEN TO DATE: 4.08
RAD ONC ARIA REFERENCE POINT ID: NORMAL
RAD ONC ARIA REFERENCE POINT ID: NORMAL
RAD ONC ARIA REFERENCE POINT SESSION DOSAGE GIVEN: 2
RAD ONC ARIA REFERENCE POINT SESSION DOSAGE GIVEN: 2.04

## 2021-11-10 PROCEDURE — 77014 PR CT GUIDANCE PLACEMENT RAD THERAPY FIELDS: CPT | Mod: 26 | Performed by: RADIOLOGY

## 2021-11-10 PROCEDURE — 77385 HCHG IMRT DELIVERY SIMPLE: CPT | Performed by: RADIOLOGY

## 2021-11-10 ASSESSMENT — PAIN SCALES - GENERAL: PAINLEVEL: NO PAIN

## 2021-11-10 NOTE — ON TREATMENT VISIT
ON TREATMENT NOTE  RADIATION ONCOLOGY DEPARTMENT    Patient name:  Vik Cody    Primary Physician:  Pcp Pt States None MRN: [id number]  CSN: [id number]   Referring physician:  Bryan Rothman M.D. : 1940, 81 y.o.     ENCOUNTER DATE:  11/10/21    DIAGNOSIS:    Prostate cancer (HCC)  Staging form: Prostate, AJCC 8th Edition  - Clinical: Stage IIC (cT1c, cN0, cM0, PSA: 12.1, Grade Group: 4) - Signed by Jarvis Xiong M.D. on 2021  Prostate specific antigen (PSA) range: 10 to 19  [place] score: 8  Histologic grading system: 5 grade system      TREATMENT SUMMARY:  [facility] Treatment Information        Some values may be hidden. Unless noted otherwise, only the newest values recorded on each date are displayed.         [facility] Treatment Summary 11/10/21   Course First Treatment Date 10/04/2021   Course Last Treatment Date 11/10/2021   Prostate/SV Plan from Course C1_Prostate   Prostate_Bst Plan from Course C1_Prostate   Fraction 2 of 15   Elapsed Course Days 37 @ [id number]   Prescribed Fraction Dose 200 cGy   Prescribed Total Dose 3,000 cGy   Prostate/SV Reference Point from Course C1_Prostate   Prostate/SV CP Reference Point from Course C1_Prostate   Prostate_Bst Reference Point from Course C1_Prostate   Elapsed Course Days 37 @ [id number]   Session Dose 200 cGy   Total Dose 400 cGy   Prostate_Bst CP Reference Point from Course C1_Prostate   Elapsed Course Days 37 @    Session Dose 204 cGy   Total Dose 408 cGy              SUBJECTIVE:   Patient is doing well.  He does not have any changes he would attribute to his radiation.    VITAL SIGNS:    Encounter Vitals  Temperature: 36.2 °C (97.1 °F)  Pulse: 66  Pulse Oximetry: 96 %  Pain Score: No pain  Pain Assessment 11/10/2021 11/3/2021 10/27/2021 10/20/2021 10/13/2021 10/6/2021   Pain Score 0 0 0 0 0 0   Some recent data might be hidden          PHYSICAL EXAM:  Physical Exam  Genitourinary:     Comments: Normal         TOXICITY  Toxicity  Assessment 11/10/2021 11/3/2021 10/27/2021 10/20/2021 10/13/2021 10/6/2021   Toxicity Assessment Male Pelvis Male Pelvis Male Pelvis Male Pelvis Male Pelvis Male Pelvis   Fatigue (lethargy, malaise, asthenia) None None None Increased fatigue over baseline, but not altering normal activities Increased fatigue over baseline, but not altering normal activities None   Radiation Dermatitis None None None None None None   Anorexia None None None None None None   Colitis None None None None None None   Constipation None None None None None None   Dehydration None None None None None None   Diarrhea w/o Colostomy Increase of <4 stools/day over pre-treatment None None None Increase of <4 stools/day over pre-treatment None   Flatulence None None None None None None   Nausea None None None None None None   Proctitis None None None None None None   Vomiting None None None None None None   RT - Pain due to RT None None None None None None   Tumor Pain (onset or exacerbation of tumor pain due to treatment) None None None None None None   Dysuria (painful urination) None None None None Mild symptoms requiring no intervention None   Urinary Frequency Increase in frequency up to 2x normal Normal Normal Increase in frequency up to 2x normal Increase in frequency up to 2x normal Normal   Urinary Urgency None None None None Present None   Bladder Spasms Absent Absent Absent Absent Mild symptoms, not requiring intervention Absent   Incontinence None None None None With coughing, sneezing, etc. None   Urinary Retention Normal Normal Normal Hesitency or dribbling, but no significant residual urine and/or retention occuring during the immediate postoperative period Hesitency or dribbling, but no significant residual urine and/or retention occuring during the immediate postoperative period Normal         IMPRESSION:  Cancer Staging  Prostate cancer (HCC)  Staging form: Prostate, AJCC 8th Edition  - Clinical: Stage IIC (cT1c, cN0, cM0, PSA:  12.1, Grade Group: 4) - Signed by Jarvis Xiong M.D. on 9/20/2021      PLAN:  No change in treatment plan    Disposition:  Treatment plan reviewed. Questions answered. Continue therapy outlined.     Jarvis Xiong M.D.    No orders of the defined types were placed in this encounter.

## 2021-11-11 ENCOUNTER — HOSPITAL ENCOUNTER (OUTPATIENT)
Dept: RADIATION ONCOLOGY | Facility: MEDICAL CENTER | Age: 81
End: 2021-11-11
Payer: COMMERCIAL

## 2021-11-11 LAB
CHEMOTHERAPY INFUSION START DATE: NORMAL
CHEMOTHERAPY RECORDS: 2
CHEMOTHERAPY RECORDS: 3000
CHEMOTHERAPY RECORDS: NORMAL
CHEMOTHERAPY RX CANCER: NORMAL
DATE 1ST CHEMO CANCER: NORMAL
RAD ONC ARIA COURSE LAST TREATMENT DATE: NORMAL
RAD ONC ARIA COURSE TREATMENT ELAPSED DAYS: NORMAL
RAD ONC ARIA REFERENCE POINT DOSAGE GIVEN TO DATE: 6
RAD ONC ARIA REFERENCE POINT DOSAGE GIVEN TO DATE: 6.13
RAD ONC ARIA REFERENCE POINT ID: NORMAL
RAD ONC ARIA REFERENCE POINT ID: NORMAL
RAD ONC ARIA REFERENCE POINT SESSION DOSAGE GIVEN: 2
RAD ONC ARIA REFERENCE POINT SESSION DOSAGE GIVEN: 2.04

## 2021-11-11 PROCEDURE — 77385 HCHG IMRT DELIVERY SIMPLE: CPT | Performed by: RADIOLOGY

## 2021-11-11 PROCEDURE — 77014 PR CT GUIDANCE PLACEMENT RAD THERAPY FIELDS: CPT | Mod: 26 | Performed by: RADIOLOGY

## 2021-11-11 PROCEDURE — 77336 RADIATION PHYSICS CONSULT: CPT | Performed by: RADIOLOGY

## 2021-11-12 ENCOUNTER — HOSPITAL ENCOUNTER (OUTPATIENT)
Dept: RADIATION ONCOLOGY | Facility: MEDICAL CENTER | Age: 81
End: 2021-11-12
Payer: COMMERCIAL

## 2021-11-12 LAB
CHEMOTHERAPY INFUSION START DATE: NORMAL
CHEMOTHERAPY RECORDS: 2
CHEMOTHERAPY RECORDS: 3000
CHEMOTHERAPY RECORDS: NORMAL
CHEMOTHERAPY RX CANCER: NORMAL
DATE 1ST CHEMO CANCER: NORMAL
RAD ONC ARIA COURSE LAST TREATMENT DATE: NORMAL
RAD ONC ARIA COURSE TREATMENT ELAPSED DAYS: NORMAL
RAD ONC ARIA REFERENCE POINT DOSAGE GIVEN TO DATE: 8
RAD ONC ARIA REFERENCE POINT DOSAGE GIVEN TO DATE: 8.17
RAD ONC ARIA REFERENCE POINT ID: NORMAL
RAD ONC ARIA REFERENCE POINT ID: NORMAL
RAD ONC ARIA REFERENCE POINT SESSION DOSAGE GIVEN: 2
RAD ONC ARIA REFERENCE POINT SESSION DOSAGE GIVEN: 2.04

## 2021-11-12 PROCEDURE — 77014 PR CT GUIDANCE PLACEMENT RAD THERAPY FIELDS: CPT | Mod: 26 | Performed by: RADIOLOGY

## 2021-11-12 PROCEDURE — 77385 HCHG IMRT DELIVERY SIMPLE: CPT | Performed by: RADIOLOGY

## 2021-11-15 ENCOUNTER — HOSPITAL ENCOUNTER (OUTPATIENT)
Dept: RADIATION ONCOLOGY | Facility: MEDICAL CENTER | Age: 81
End: 2021-11-15
Payer: COMMERCIAL

## 2021-11-15 LAB
CHEMOTHERAPY INFUSION START DATE: NORMAL
CHEMOTHERAPY RECORDS: 2
CHEMOTHERAPY RECORDS: 3000
CHEMOTHERAPY RECORDS: NORMAL
CHEMOTHERAPY RX CANCER: NORMAL
DATE 1ST CHEMO CANCER: NORMAL
RAD ONC ARIA COURSE LAST TREATMENT DATE: NORMAL
RAD ONC ARIA COURSE TREATMENT ELAPSED DAYS: NORMAL
RAD ONC ARIA REFERENCE POINT DOSAGE GIVEN TO DATE: 10
RAD ONC ARIA REFERENCE POINT DOSAGE GIVEN TO DATE: 10.21
RAD ONC ARIA REFERENCE POINT ID: NORMAL
RAD ONC ARIA REFERENCE POINT ID: NORMAL
RAD ONC ARIA REFERENCE POINT SESSION DOSAGE GIVEN: 2
RAD ONC ARIA REFERENCE POINT SESSION DOSAGE GIVEN: 2.04

## 2021-11-15 PROCEDURE — 77014 PR CT GUIDANCE PLACEMENT RAD THERAPY FIELDS: CPT | Mod: 26 | Performed by: RADIOLOGY

## 2021-11-15 PROCEDURE — 77385 HCHG IMRT DELIVERY SIMPLE: CPT | Performed by: RADIOLOGY

## 2021-11-15 PROCEDURE — 77427 RADIATION TX MANAGEMENT X5: CPT | Performed by: RADIOLOGY

## 2021-11-16 ENCOUNTER — HOSPITAL ENCOUNTER (OUTPATIENT)
Dept: RADIATION ONCOLOGY | Facility: MEDICAL CENTER | Age: 81
End: 2021-11-16
Payer: COMMERCIAL

## 2021-11-16 LAB
CHEMOTHERAPY INFUSION START DATE: NORMAL
CHEMOTHERAPY RECORDS: 2
CHEMOTHERAPY RECORDS: 3000
CHEMOTHERAPY RECORDS: NORMAL
CHEMOTHERAPY RX CANCER: NORMAL
DATE 1ST CHEMO CANCER: NORMAL
RAD ONC ARIA COURSE LAST TREATMENT DATE: NORMAL
RAD ONC ARIA COURSE TREATMENT ELAPSED DAYS: NORMAL
RAD ONC ARIA REFERENCE POINT DOSAGE GIVEN TO DATE: 12
RAD ONC ARIA REFERENCE POINT DOSAGE GIVEN TO DATE: 12.25
RAD ONC ARIA REFERENCE POINT ID: NORMAL
RAD ONC ARIA REFERENCE POINT ID: NORMAL
RAD ONC ARIA REFERENCE POINT SESSION DOSAGE GIVEN: 2
RAD ONC ARIA REFERENCE POINT SESSION DOSAGE GIVEN: 2.04

## 2021-11-16 PROCEDURE — 77385 HCHG IMRT DELIVERY SIMPLE: CPT | Performed by: RADIOLOGY

## 2021-11-16 PROCEDURE — 77014 PR CT GUIDANCE PLACEMENT RAD THERAPY FIELDS: CPT | Mod: 26 | Performed by: RADIOLOGY

## 2021-11-17 ENCOUNTER — HOSPITAL ENCOUNTER (OUTPATIENT)
Dept: RADIATION ONCOLOGY | Facility: MEDICAL CENTER | Age: 81
End: 2021-11-17
Payer: COMMERCIAL

## 2021-11-17 VITALS
SYSTOLIC BLOOD PRESSURE: 155 MMHG | DIASTOLIC BLOOD PRESSURE: 79 MMHG | HEART RATE: 61 BPM | TEMPERATURE: 97.6 F | OXYGEN SATURATION: 95 %

## 2021-11-17 LAB
CHEMOTHERAPY INFUSION START DATE: NORMAL
CHEMOTHERAPY RECORDS: 2
CHEMOTHERAPY RECORDS: 3000
CHEMOTHERAPY RECORDS: NORMAL
CHEMOTHERAPY RX CANCER: NORMAL
DATE 1ST CHEMO CANCER: NORMAL
RAD ONC ARIA COURSE LAST TREATMENT DATE: NORMAL
RAD ONC ARIA COURSE TREATMENT ELAPSED DAYS: NORMAL
RAD ONC ARIA REFERENCE POINT DOSAGE GIVEN TO DATE: 14
RAD ONC ARIA REFERENCE POINT DOSAGE GIVEN TO DATE: 14.29
RAD ONC ARIA REFERENCE POINT ID: NORMAL
RAD ONC ARIA REFERENCE POINT ID: NORMAL
RAD ONC ARIA REFERENCE POINT SESSION DOSAGE GIVEN: 2
RAD ONC ARIA REFERENCE POINT SESSION DOSAGE GIVEN: 2.04

## 2021-11-17 PROCEDURE — 77014 PR CT GUIDANCE PLACEMENT RAD THERAPY FIELDS: CPT | Mod: 26 | Performed by: RADIOLOGY

## 2021-11-17 PROCEDURE — 77385 HCHG IMRT DELIVERY SIMPLE: CPT | Performed by: RADIOLOGY

## 2021-11-17 ASSESSMENT — PAIN SCALES - GENERAL: PAINLEVEL: NO PAIN

## 2021-11-17 NOTE — ON TREATMENT VISIT
ON TREATMENT NOTE  RADIATION ONCOLOGY DEPARTMENT    Patient name:  Vik Cody    Primary Physician:  Pcp Pt States None MRN: [id number]  CSN: [id number]   Referring physician:  Bryan Rothman M.D. : 1940, 81 y.o.     ENCOUNTER DATE:  21    DIAGNOSIS:    Prostate cancer (HCC)  Staging form: Prostate, AJCC 8th Edition  - Clinical: Stage IIC (cT1c, cN0, cM0, PSA: 12.1, Grade Group: 4) - Signed by Jarvis Xiong M.D. on 2021  Prostate specific antigen (PSA) range: 10 to 19  [place] score: 8  Histologic grading system: 5 grade system      TREATMENT SUMMARY:  [facility] Treatment Information        Some values may be hidden. Unless noted otherwise, only the newest values recorded on each date are displayed.         [facility] Treatment Summary 21   Course First Treatment Date 10/04/2021   Course Last Treatment Date 2021   Prostate/SV Plan from Course C1_Prostate   Prostate_Bst Plan from Course C1_Prostate   Fraction 7 of 15   Elapsed Course Days 44 @ [id number]   Prescribed Fraction Dose 200 cGy   Prescribed Total Dose 3,000 cGy   Prostate/SV Reference Point from Course C1_Prostate   Prostate/SV CP Reference Point from Course C1_Prostate   Prostate_Bst Reference Point from Course C1_Prostate   Elapsed Course Days  @ [id number]   Session Dose 200 cGy   Total Dose 1,400 cGy   Prostate_Bst CP Reference Point from Course C1_Prostate   Elapsed Course Days  @ [id number]   Session Dose 204 cGy   Total Dose 1,429 cGy              SUBJECTIVE:   Patient is doing well.  He does not have any changes he would attribute to his radiation.    VITAL SIGNS:    Encounter Vitals  Temperature: 36.4 °C (97.6 °F)  Blood Pressure : 155/79  Pulse: 61  Pulse Oximetry: 95 %  Pain Score: No pain  Pain Assessment 2021 11/10/2021 11/3/2021 10/27/2021 10/20/2021 10/13/2021   Pain Score 0 0 0 0 0 0   Some recent data might be hidden          PHYSICAL EXAM:  Physical Exam  Genitourinary:     Comments:  Normal         TOXICITY  Toxicity Assessment 11/17/2021 11/10/2021 11/3/2021 10/27/2021 10/20/2021 10/13/2021 10/6/2021   Toxicity Assessment Male Pelvis Male Pelvis Male Pelvis Male Pelvis Male Pelvis Male Pelvis Male Pelvis   Fatigue (lethargy, malaise, asthenia) Increased fatigue over baseline, but not altering normal activities None None None Increased fatigue over baseline, but not altering normal activities Increased fatigue over baseline, but not altering normal activities None   Radiation Dermatitis None None None None None None None   Anorexia None None None None None None None   Colitis None None None None None None None   Constipation None None None None None None None   Dehydration None None None None None None None   Diarrhea w/o Colostomy None Increase of <4 stools/day over pre-treatment None None None Increase of <4 stools/day over pre-treatment None   Flatulence None None None None None None None   Nausea None None None None None None None   Proctitis None None None None None None None   Vomiting None None None None None None None   RT - Pain due to RT None None None None None None None   Tumor Pain (onset or exacerbation of tumor pain due to treatment) None None None None None None None   Dysuria (painful urination) Mild symptoms requiring no intervention None None None None Mild symptoms requiring no intervention None   Urinary Frequency Increase in frequency up to 2x normal Increase in frequency up to 2x normal Normal Normal Increase in frequency up to 2x normal Increase in frequency up to 2x normal Normal   Urinary Urgency Present None None None None Present None   Bladder Spasms Mild symptoms, not requiring intervention Absent Absent Absent Absent Mild symptoms, not requiring intervention Absent   Incontinence With coughing, sneezing, etc. None None None None With coughing, sneezing, etc. None   Urinary Retention Hesitency or dribbling, but no significant residual urine and/or retention occuring  during the immediate postoperative period Normal Normal Normal Hesitency or dribbling, but no significant residual urine and/or retention occuring during the immediate postoperative period Hesitency or dribbling, but no significant residual urine and/or retention occuring during the immediate postoperative period Normal         IMPRESSION:  Cancer Staging  Prostate cancer (HCC)  Staging form: Prostate, AJCC 8th Edition  - Clinical: Stage IIC (cT1c, cN0, cM0, PSA: 12.1, Grade Group: 4) - Signed by Jarvis Xiong M.D. on 9/20/2021      PLAN:  No change in treatment plan    Disposition:  Treatment plan reviewed. Questions answered. Continue therapy outlined.     Jarvis Xiong M.D.    No orders of the defined types were placed in this encounter.

## 2021-11-18 ENCOUNTER — HOSPITAL ENCOUNTER (OUTPATIENT)
Dept: RADIATION ONCOLOGY | Facility: MEDICAL CENTER | Age: 81
End: 2021-11-18
Payer: COMMERCIAL

## 2021-11-18 LAB
CHEMOTHERAPY INFUSION START DATE: NORMAL
CHEMOTHERAPY RECORDS: 2
CHEMOTHERAPY RECORDS: 3000
CHEMOTHERAPY RECORDS: NORMAL
CHEMOTHERAPY RX CANCER: NORMAL
DATE 1ST CHEMO CANCER: NORMAL
RAD ONC ARIA COURSE LAST TREATMENT DATE: NORMAL
RAD ONC ARIA COURSE TREATMENT ELAPSED DAYS: NORMAL
RAD ONC ARIA REFERENCE POINT DOSAGE GIVEN TO DATE: 16
RAD ONC ARIA REFERENCE POINT DOSAGE GIVEN TO DATE: 16.33
RAD ONC ARIA REFERENCE POINT ID: NORMAL
RAD ONC ARIA REFERENCE POINT ID: NORMAL
RAD ONC ARIA REFERENCE POINT SESSION DOSAGE GIVEN: 2
RAD ONC ARIA REFERENCE POINT SESSION DOSAGE GIVEN: 2.04

## 2021-11-18 PROCEDURE — 77336 RADIATION PHYSICS CONSULT: CPT | Performed by: RADIOLOGY

## 2021-11-18 PROCEDURE — 77014 PR CT GUIDANCE PLACEMENT RAD THERAPY FIELDS: CPT | Mod: 26 | Performed by: RADIOLOGY

## 2021-11-18 PROCEDURE — 700111 HCHG RX REV CODE 636 W/ 250 OVERRIDE (IP)

## 2021-11-18 PROCEDURE — 700101 HCHG RX REV CODE 250

## 2021-11-18 PROCEDURE — 77385 HCHG IMRT DELIVERY SIMPLE: CPT | Performed by: RADIOLOGY

## 2021-11-19 ENCOUNTER — HOSPITAL ENCOUNTER (OUTPATIENT)
Dept: RADIATION ONCOLOGY | Facility: MEDICAL CENTER | Age: 81
End: 2021-11-19
Payer: COMMERCIAL

## 2021-11-19 LAB
CHEMOTHERAPY INFUSION START DATE: NORMAL
CHEMOTHERAPY RECORDS: 2
CHEMOTHERAPY RECORDS: 3000
CHEMOTHERAPY RECORDS: NORMAL
CHEMOTHERAPY RX CANCER: NORMAL
DATE 1ST CHEMO CANCER: NORMAL
RAD ONC ARIA COURSE LAST TREATMENT DATE: NORMAL
RAD ONC ARIA COURSE TREATMENT ELAPSED DAYS: NORMAL
RAD ONC ARIA REFERENCE POINT DOSAGE GIVEN TO DATE: 18
RAD ONC ARIA REFERENCE POINT DOSAGE GIVEN TO DATE: 18.38
RAD ONC ARIA REFERENCE POINT ID: NORMAL
RAD ONC ARIA REFERENCE POINT ID: NORMAL
RAD ONC ARIA REFERENCE POINT SESSION DOSAGE GIVEN: 2
RAD ONC ARIA REFERENCE POINT SESSION DOSAGE GIVEN: 2.04

## 2021-11-19 PROCEDURE — 77385 HCHG IMRT DELIVERY SIMPLE: CPT | Performed by: RADIOLOGY

## 2021-11-19 PROCEDURE — 77014 PR CT GUIDANCE PLACEMENT RAD THERAPY FIELDS: CPT | Mod: 26 | Performed by: RADIOLOGY

## 2021-11-21 ENCOUNTER — HOSPITAL ENCOUNTER (OUTPATIENT)
Dept: RADIATION ONCOLOGY | Facility: MEDICAL CENTER | Age: 81
End: 2021-11-21
Payer: COMMERCIAL

## 2021-11-21 LAB
CHEMOTHERAPY INFUSION START DATE: NORMAL
CHEMOTHERAPY RECORDS: 2
CHEMOTHERAPY RECORDS: 3000
CHEMOTHERAPY RECORDS: NORMAL
CHEMOTHERAPY RX CANCER: NORMAL
DATE 1ST CHEMO CANCER: NORMAL
RAD ONC ARIA COURSE LAST TREATMENT DATE: NORMAL
RAD ONC ARIA COURSE TREATMENT ELAPSED DAYS: NORMAL
RAD ONC ARIA REFERENCE POINT DOSAGE GIVEN TO DATE: 20
RAD ONC ARIA REFERENCE POINT DOSAGE GIVEN TO DATE: 20.42
RAD ONC ARIA REFERENCE POINT ID: NORMAL
RAD ONC ARIA REFERENCE POINT ID: NORMAL
RAD ONC ARIA REFERENCE POINT SESSION DOSAGE GIVEN: 2
RAD ONC ARIA REFERENCE POINT SESSION DOSAGE GIVEN: 2.04

## 2021-11-21 PROCEDURE — 77385 HCHG IMRT DELIVERY SIMPLE: CPT | Performed by: RADIOLOGY

## 2021-11-21 PROCEDURE — 77014 PR CT GUIDANCE PLACEMENT RAD THERAPY FIELDS: CPT | Mod: 26 | Performed by: RADIOLOGY

## 2021-11-21 PROCEDURE — 77427 RADIATION TX MANAGEMENT X5: CPT | Performed by: RADIOLOGY

## 2021-11-22 ENCOUNTER — HOSPITAL ENCOUNTER (OUTPATIENT)
Dept: RADIATION ONCOLOGY | Facility: MEDICAL CENTER | Age: 81
End: 2021-11-22
Payer: COMMERCIAL

## 2021-11-22 LAB
CHEMOTHERAPY INFUSION START DATE: NORMAL
CHEMOTHERAPY RECORDS: 2
CHEMOTHERAPY RECORDS: 3000
CHEMOTHERAPY RECORDS: NORMAL
CHEMOTHERAPY RX CANCER: NORMAL
DATE 1ST CHEMO CANCER: NORMAL
RAD ONC ARIA COURSE LAST TREATMENT DATE: NORMAL
RAD ONC ARIA COURSE TREATMENT ELAPSED DAYS: NORMAL
RAD ONC ARIA REFERENCE POINT DOSAGE GIVEN TO DATE: 22
RAD ONC ARIA REFERENCE POINT DOSAGE GIVEN TO DATE: 22.46
RAD ONC ARIA REFERENCE POINT ID: NORMAL
RAD ONC ARIA REFERENCE POINT ID: NORMAL
RAD ONC ARIA REFERENCE POINT SESSION DOSAGE GIVEN: 2
RAD ONC ARIA REFERENCE POINT SESSION DOSAGE GIVEN: 2.04

## 2021-11-22 PROCEDURE — 77385 HCHG IMRT DELIVERY SIMPLE: CPT | Performed by: RADIOLOGY

## 2021-11-22 PROCEDURE — 77014 PR CT GUIDANCE PLACEMENT RAD THERAPY FIELDS: CPT | Mod: 26 | Performed by: RADIOLOGY

## 2021-11-23 ENCOUNTER — HOSPITAL ENCOUNTER (OUTPATIENT)
Dept: RADIATION ONCOLOGY | Facility: MEDICAL CENTER | Age: 81
End: 2021-11-23
Payer: COMMERCIAL

## 2021-11-23 LAB
CHEMOTHERAPY INFUSION START DATE: NORMAL
CHEMOTHERAPY RECORDS: 2
CHEMOTHERAPY RECORDS: 3000
CHEMOTHERAPY RECORDS: NORMAL
CHEMOTHERAPY RX CANCER: NORMAL
DATE 1ST CHEMO CANCER: NORMAL
RAD ONC ARIA COURSE LAST TREATMENT DATE: NORMAL
RAD ONC ARIA COURSE TREATMENT ELAPSED DAYS: NORMAL
RAD ONC ARIA REFERENCE POINT DOSAGE GIVEN TO DATE: 24
RAD ONC ARIA REFERENCE POINT DOSAGE GIVEN TO DATE: 24.5
RAD ONC ARIA REFERENCE POINT ID: NORMAL
RAD ONC ARIA REFERENCE POINT ID: NORMAL
RAD ONC ARIA REFERENCE POINT SESSION DOSAGE GIVEN: 2
RAD ONC ARIA REFERENCE POINT SESSION DOSAGE GIVEN: 2.04

## 2021-11-23 PROCEDURE — 77385 HCHG IMRT DELIVERY SIMPLE: CPT | Performed by: RADIOLOGY

## 2021-11-23 PROCEDURE — 77014 PR CT GUIDANCE PLACEMENT RAD THERAPY FIELDS: CPT | Mod: 26 | Performed by: RADIOLOGY

## 2021-11-24 ENCOUNTER — HOSPITAL ENCOUNTER (OUTPATIENT)
Dept: RADIATION ONCOLOGY | Facility: MEDICAL CENTER | Age: 81
End: 2021-11-24
Payer: COMMERCIAL

## 2021-11-24 VITALS — HEART RATE: 69 BPM | TEMPERATURE: 97.8 F | OXYGEN SATURATION: 97 %

## 2021-11-24 LAB
CHEMOTHERAPY INFUSION START DATE: NORMAL
CHEMOTHERAPY RECORDS: 2
CHEMOTHERAPY RECORDS: 3000
CHEMOTHERAPY RECORDS: NORMAL
CHEMOTHERAPY RX CANCER: NORMAL
DATE 1ST CHEMO CANCER: NORMAL
RAD ONC ARIA COURSE LAST TREATMENT DATE: NORMAL
RAD ONC ARIA COURSE TREATMENT ELAPSED DAYS: NORMAL
RAD ONC ARIA REFERENCE POINT DOSAGE GIVEN TO DATE: 26
RAD ONC ARIA REFERENCE POINT DOSAGE GIVEN TO DATE: 26.54
RAD ONC ARIA REFERENCE POINT ID: NORMAL
RAD ONC ARIA REFERENCE POINT ID: NORMAL
RAD ONC ARIA REFERENCE POINT SESSION DOSAGE GIVEN: 2
RAD ONC ARIA REFERENCE POINT SESSION DOSAGE GIVEN: 2.04

## 2021-11-24 PROCEDURE — 77385 HCHG IMRT DELIVERY SIMPLE: CPT | Performed by: RADIOLOGY

## 2021-11-24 PROCEDURE — 77014 PR CT GUIDANCE PLACEMENT RAD THERAPY FIELDS: CPT | Mod: 26 | Performed by: RADIOLOGY

## 2021-11-24 PROCEDURE — 77336 RADIATION PHYSICS CONSULT: CPT | Performed by: RADIOLOGY

## 2021-11-24 ASSESSMENT — PAIN SCALES - GENERAL: PAINLEVEL: NO PAIN

## 2021-11-24 NOTE — ON TREATMENT VISIT
ON TREATMENT NOTE  RADIATION ONCOLOGY DEPARTMENT    Patient name:  Vik Cody    Primary Physician:  Pcp Pt States None MRN: [id number]  CSN: [id number]   Referring physician:  Bryan Rothman M.D. : 1940, 81 y.o.     ENCOUNTER DATE:  21    DIAGNOSIS:    Prostate cancer (HCC)  Staging form: Prostate, AJCC 8th Edition  - Clinical: Stage IIC (cT1c, cN0, cM0, PSA: 12.1, Grade Group: 4) - Signed by Jarvis Xiong M.D. on 2021  Prostate specific antigen (PSA) range: 10 to 19  [place] score: 8  Histologic grading system: 5 grade system      TREATMENT SUMMARY:  [facility] Treatment Information        Some values may be hidden. Unless noted otherwise, only the newest values recorded on each date are displayed.         [facility] Treatment Summary 21   Course First Treatment Date 10/04/2021   Course Last Treatment Date 2021   Prostate/SV Plan from Course C1_Prostate   Prostate_Bst Plan from Course C1_Prostate   Fraction 13 of 15   Elapsed Course Days 51 @    Prescribed Fraction Dose 200 cGy   Prescribed Total Dose 3,000 cGy   Prostate/SV Reference Point from Course C1_Prostate   Prostate/SV CP Reference Point from Course C1_Prostate   Prostate_Bst Reference Point from Course C1_Prostate   Elapsed Course Days 51 @    Session Dose 200 cGy   Total Dose 2,600 cGy   Prostate_Bst CP Reference Point from Course C1_Prostate   Elapsed Course Days 51 @    Session Dose 204 cGy   Total Dose 2,654 cGy              SUBJECTIVE:   Patient is doing well.  He does not have any significant changes he would attribute to his radiation.    VITAL SIGNS:    Encounter Vitals  Temperature: 36.6 °C (97.8 °F)  Pulse: 69  Pulse Oximetry: 97 %  Pain Score: No pain  Pain Assessment 2021 2021 11/10/2021 11/3/2021 10/27/2021 10/20/2021   Pain Score 0 0 0 0 0 0   Some recent data might be hidden          PHYSICAL EXAM:  Physical Exam  Genitourinary:     Comments: Normal          TOXICITY  Toxicity Assessment 11/24/2021 11/17/2021 11/10/2021 11/3/2021 10/27/2021 10/20/2021 10/13/2021   Toxicity Assessment Male Pelvis Male Pelvis Male Pelvis Male Pelvis Male Pelvis Male Pelvis Male Pelvis   Fatigue (lethargy, malaise, asthenia) None Increased fatigue over baseline, but not altering normal activities None None None Increased fatigue over baseline, but not altering normal activities Increased fatigue over baseline, but not altering normal activities   Radiation Dermatitis None None None None None None None   Anorexia None None None None None None None   Colitis None None None None None None None   Constipation None None None None None None None   Dehydration None None None None None None None   Diarrhea w/o Colostomy None None Increase of <4 stools/day over pre-treatment None None None Increase of <4 stools/day over pre-treatment   Flatulence None None None None None None None   Nausea None None None None None None None   Proctitis None None None None None None None   Vomiting None None None None None None None   RT - Pain due to RT None None None None None None None   Tumor Pain (onset or exacerbation of tumor pain due to treatment) None None None None None None None   Dysuria (painful urination) None Mild symptoms requiring no intervention None None None None Mild symptoms requiring no intervention   Urinary Frequency Increase >2x normal but <hourly Increase in frequency up to 2x normal Increase in frequency up to 2x normal Normal Normal Increase in frequency up to 2x normal Increase in frequency up to 2x normal   Urinary Urgency None Present None None None None Present   Bladder Spasms Absent Mild symptoms, not requiring intervention Absent Absent Absent Absent Mild symptoms, not requiring intervention   Incontinence None With coughing, sneezing, etc. None None None None With coughing, sneezing, etc.   Urinary Retention Hesitency or dribbling, but no significant residual urine and/or  retention occuring during the immediate postoperative period Hesitency or dribbling, but no significant residual urine and/or retention occuring during the immediate postoperative period Normal Normal Normal Hesitency or dribbling, but no significant residual urine and/or retention occuring during the immediate postoperative period Hesitency or dribbling, but no significant residual urine and/or retention occuring during the immediate postoperative period         IMPRESSION:  Cancer Staging  Prostate cancer (HCC)  Staging form: Prostate, AJCC 8th Edition  - Clinical: Stage IIC (cT1c, cN0, cM0, PSA: 12.1, Grade Group: 4) - Signed by Jarvis Xiong M.D. on 9/20/2021      PLAN:  No change in treatment plan    Disposition:  Treatment plan reviewed. Questions answered. Continue therapy outlined.     Jarvis Xiong M.D.    No orders of the defined types were placed in this encounter.

## 2021-11-29 ENCOUNTER — HOSPITAL ENCOUNTER (OUTPATIENT)
Dept: RADIATION ONCOLOGY | Facility: MEDICAL CENTER | Age: 81
End: 2021-11-29
Payer: COMMERCIAL

## 2021-11-29 LAB
CHEMOTHERAPY INFUSION START DATE: NORMAL
CHEMOTHERAPY RECORDS: 2
CHEMOTHERAPY RECORDS: 3000
CHEMOTHERAPY RECORDS: NORMAL
CHEMOTHERAPY RX CANCER: NORMAL
DATE 1ST CHEMO CANCER: NORMAL
RAD ONC ARIA COURSE LAST TREATMENT DATE: NORMAL
RAD ONC ARIA COURSE TREATMENT ELAPSED DAYS: NORMAL
RAD ONC ARIA REFERENCE POINT DOSAGE GIVEN TO DATE: 28
RAD ONC ARIA REFERENCE POINT DOSAGE GIVEN TO DATE: 28.58
RAD ONC ARIA REFERENCE POINT ID: NORMAL
RAD ONC ARIA REFERENCE POINT ID: NORMAL
RAD ONC ARIA REFERENCE POINT SESSION DOSAGE GIVEN: 2
RAD ONC ARIA REFERENCE POINT SESSION DOSAGE GIVEN: 2.04

## 2021-11-29 PROCEDURE — 77385 HCHG IMRT DELIVERY SIMPLE: CPT | Performed by: RADIOLOGY

## 2021-11-29 PROCEDURE — 77014 PR CT GUIDANCE PLACEMENT RAD THERAPY FIELDS: CPT | Mod: 26 | Performed by: RADIOLOGY

## 2021-11-30 ENCOUNTER — HOSPITAL ENCOUNTER (OUTPATIENT)
Dept: RADIATION ONCOLOGY | Facility: MEDICAL CENTER | Age: 81
End: 2021-11-30
Payer: COMMERCIAL

## 2021-11-30 LAB
CHEMOTHERAPY INFUSION START DATE: NORMAL
CHEMOTHERAPY INFUSION START DATE: NORMAL
CHEMOTHERAPY INFUSION STOP DATE: NORMAL
CHEMOTHERAPY RECORDS: 2
CHEMOTHERAPY RECORDS: 3000
CHEMOTHERAPY RECORDS: 3000
CHEMOTHERAPY RECORDS: 5000
CHEMOTHERAPY RECORDS: NORMAL
CHEMOTHERAPY RX CANCER: NORMAL
CHEMOTHERAPY RX CANCER: NORMAL
DATE 1ST CHEMO CANCER: NORMAL
DATE 1ST CHEMO CANCER: NORMAL
RAD ONC ARIA COURSE LAST TREATMENT DATE: NORMAL
RAD ONC ARIA COURSE LAST TREATMENT DATE: NORMAL
RAD ONC ARIA COURSE TREATMENT ELAPSED DAYS: NORMAL
RAD ONC ARIA COURSE TREATMENT ELAPSED DAYS: NORMAL
RAD ONC ARIA REFERENCE POINT DOSAGE GIVEN TO DATE: 30
RAD ONC ARIA REFERENCE POINT DOSAGE GIVEN TO DATE: 30
RAD ONC ARIA REFERENCE POINT DOSAGE GIVEN TO DATE: 30.63
RAD ONC ARIA REFERENCE POINT DOSAGE GIVEN TO DATE: 30.63
RAD ONC ARIA REFERENCE POINT DOSAGE GIVEN TO DATE: 50
RAD ONC ARIA REFERENCE POINT DOSAGE GIVEN TO DATE: 51.58
RAD ONC ARIA REFERENCE POINT ID: NORMAL
RAD ONC ARIA REFERENCE POINT SESSION DOSAGE GIVEN: 2
RAD ONC ARIA REFERENCE POINT SESSION DOSAGE GIVEN: 2.04

## 2021-11-30 PROCEDURE — 77427 RADIATION TX MANAGEMENT X5: CPT | Performed by: RADIOLOGY

## 2021-11-30 PROCEDURE — 77014 PR CT GUIDANCE PLACEMENT RAD THERAPY FIELDS: CPT | Mod: 26 | Performed by: RADIOLOGY

## 2021-11-30 PROCEDURE — 77385 HCHG IMRT DELIVERY SIMPLE: CPT | Performed by: RADIOLOGY

## 2022-01-13 ENCOUNTER — HOSPITAL ENCOUNTER (OUTPATIENT)
Dept: RADIATION ONCOLOGY | Facility: MEDICAL CENTER | Age: 82
End: 2022-01-31
Attending: RADIOLOGY
Payer: COMMERCIAL

## 2022-01-13 DIAGNOSIS — C61 PROSTATE CANCER (HCC): ICD-10-CM

## 2022-01-13 NOTE — PROGRESS NOTES
Telephone Appointment Visit   As a means of avoiding spread of COVID-19, this visit is being conducted by telephone. This telephone visit was initiated by the patient and they verbally consented.    Time at start of call: 1:00pm    Reason for Call:  Symptom Follow-up    HPI:    High risk adenocarcinoma the prostate, clinical T1c, PSA 12.68, [place] score 4+4 = 8, first Lupron injection 9/9/2021 completing definitive prostate radiation to 8000 cGy in December 2021    Patient states from a symptom standpoint he has been relatively stable.  He did not have any change in urinary or bowel changes after completing radiation although he does state that he has some loose stools over the last week that just came on abruptly.  Prior to that he was not having any bowel symptoms.  He has been having regular bowel movements before this change.  He was not suffering any constipation.  He had his most recent Lupron injection at the [facility].  He does continue to have hot flashes.       Labs / Images Reviewed:   none    Assessment and Plan:     1. Prostate cancer (HCC)    Other orders  - diclofenac sodium (VOLTAREN) 1 % Gel    Per the [facility] patient will continue to follow in the urology clinic at the [facility].  Follow-up: as needed    Time at end of call: 1:10  Total Time Spent: 5-10 minutes    Jarvis Xiong M.D.

## 2023-01-31 ENCOUNTER — APPOINTMENT (RX ONLY)
Dept: URBAN - METROPOLITAN AREA CLINIC 4 | Facility: CLINIC | Age: 83
Setting detail: DERMATOLOGY
End: 2023-01-31

## 2023-01-31 DIAGNOSIS — L40.0 PSORIASIS VULGARIS: ICD-10-CM

## 2023-01-31 PROCEDURE — ? ADDITIONAL NOTES

## 2023-01-31 PROCEDURE — ? PRESCRIPTION MEDICATION MANAGEMENT

## 2023-01-31 PROCEDURE — ? ORDER TESTS

## 2023-01-31 PROCEDURE — ? COUNSELING

## 2023-01-31 PROCEDURE — ? PRESCRIPTION

## 2023-01-31 PROCEDURE — 99203 OFFICE O/P NEW LOW 30 MIN: CPT

## 2023-01-31 RX ORDER — APREMILAST 10-20-30MG
1 KIT ORAL BID
Qty: 1 | Refills: 0 | Status: ERX | COMMUNITY
Start: 2023-01-31

## 2023-01-31 RX ORDER — TAZAROTENE 0.1 MG/G
CREAM CUTANEOUS QD
Qty: 60 | Refills: 1 | Status: ERX | COMMUNITY
Start: 2023-01-31

## 2023-01-31 RX ADMIN — APREMILAST 1: KIT at 00:00

## 2023-01-31 RX ADMIN — TAZAROTENE 1: 0.1 CREAM CUTANEOUS at 00:00

## 2023-01-31 ASSESSMENT — LOCATION ZONE DERM
LOCATION ZONE: FEET
LOCATION ZONE: TRUNK
LOCATION ZONE: HAND
LOCATION ZONE: ARM

## 2023-01-31 ASSESSMENT — LOCATION DETAILED DESCRIPTION DERM
LOCATION DETAILED: LEFT INSTEP
LOCATION DETAILED: RIGHT ULNAR DORSAL HAND
LOCATION DETAILED: LEFT RADIAL DORSAL HAND
LOCATION DETAILED: RIGHT INSTEP
LOCATION DETAILED: RIGHT PROXIMAL DORSAL FOREARM
LOCATION DETAILED: GLUTEAL CLEFT
LOCATION DETAILED: LEFT PROXIMAL DORSAL FOREARM

## 2023-01-31 ASSESSMENT — LOCATION SIMPLE DESCRIPTION DERM
LOCATION SIMPLE: LEFT HAND
LOCATION SIMPLE: GLUTEAL CLEFT
LOCATION SIMPLE: RIGHT FOREARM
LOCATION SIMPLE: RIGHT HAND
LOCATION SIMPLE: RIGHT PLANTAR SURFACE
LOCATION SIMPLE: LEFT FOREARM
LOCATION SIMPLE: LEFT PLANTAR SURFACE

## 2023-01-31 NOTE — PROCEDURE: PRESCRIPTION MEDICATION MANAGEMENT
Render In Strict Bullet Format?: No
Detail Level: Zone
Continue Regimen: Clobetasol in the evening
Initiate Treatment: Tazorac in the morning

## 2023-01-31 NOTE — PROCEDURE: ADDITIONAL NOTES
Additional Notes: Soak and Smear for psoriasis on the hands and feet for the next 3 days then stop the soaks; on the rest of the psoriasis on the body you can simply use the clobetasol at night and the tazarotene in the morning:\\nStep 1\\Brinda bedtime soak the rash on the hands and feet in comfortable tap water for 3-5 minutes; mix 2 tbsp of white vinegar per 1 cup of tap water. Remove hands and feet, pat dry, and apply the clobetasol ointment to the affected areas. Apply Cera Ve Moisturizing Cream to all of the skin, including over the top of the medicine; do not apply between the toes. Zohreh a pair of soft cotton gloves and cotton socks for 30  minutes. In the morning, apply the tazarotene cream to the psoriasis, but no soak or moisturizing cream. \\n\\nMoisturization:\\nUse the CeraVe Moisturizing Cream throughout the day especially after hand washing and after removing gloves; this seals the moisture in the skin and prevents drying of the hands. You can apply it to the psoriasis on the feet at noon then the later afternoon; remove your socks, pat the skin dry, and apply the moisturizer followed by a fresh pair of socks.
Detail Level: Simple
Render Risk Assessment In Note?: no
Additional Notes: Discussed the pros/cons of MTX vs Otezla. The patient is overweight, leaving concern for fatty liver disease, and with his age the risk for diminished renal function. A systemic medicine is warranted due to the involvement of the hands, feet, and scalp. We discussed Otezla an he is interested in this. Consequently, we will apply for Otezla. Add clobetasol and Tazorac. We will contact the patient with his insurance's reply regarding the Otezla. He can continue the current dose of MTX.

## 2023-01-31 NOTE — PROCEDURE: ORDER TESTS
Performing Laboratory: [id number]
Expected Date Of Service: 01/31/2023
Bill For Surgical Tray: no
Lab Facility: [id number]
Billing Type: Third-Party Bill